# Patient Record
Sex: FEMALE | Race: WHITE | Employment: UNEMPLOYED | URBAN - NONMETROPOLITAN AREA
[De-identification: names, ages, dates, MRNs, and addresses within clinical notes are randomized per-mention and may not be internally consistent; named-entity substitution may affect disease eponyms.]

---

## 2020-12-17 ENCOUNTER — HOSPITAL ENCOUNTER (OUTPATIENT)
Age: 55
Discharge: HOME | End: 2020-12-17
Payer: COMMERCIAL

## 2020-12-17 ENCOUNTER — HOSPITAL ENCOUNTER (OUTPATIENT)
Dept: GENERAL RADIOLOGY | Age: 55
Discharge: HOME | End: 2020-12-17
Payer: COMMERCIAL

## 2020-12-17 PROCEDURE — 73030 X-RAY EXAM OF SHOULDER: CPT

## 2022-02-08 NOTE — PROGRESS NOTES
Chronic Pain/PM&R Clinic Note     Encounter Date: 2/9/22    Subjective:   Chief Complaint:   Chief Complaint   Patient presents with    New Patient     bilateral knee pain, elbow and shoulder       History of Present Illness:   Wei Mack is a 61 y.o. female seen in the clinic initially on 02/09/22 upon request from [address], [address],   for her history of bilateral knee, right shoulder and left elbow pain. Patient states her biggest pain generator at this time is her bilateral hips. Patient states she has had hip pain for over 10 years. She initially started seeing Dr. Monique Magallanes at [facility] in 2012 or 2013 for this issue. Patient states at that time she did have an MRI of her bilateral hips but she does not recall the results. She was never told that she had any significant findings. Patient states she currently uses a back brace to assist with her low back pain. She will only wear this when she is out and about or driving. Patient states last time she did physical therapy for her hips was around 2016. She states this was not beneficial.  Patient denies any recent imaging of her hips. Patient states she does have a history of degenerative disc disease. Patient states pain is aggravated with activity, standing, sitting. She states her right hip is significantly worse than her left. She cannot lay on her right side. Patient states she is not sleeping well due to pain. Patient states she takes Tylenol and Aleve which do help take the edge off. Patient states when she is in significant pain she has to sit down and rest.  He also provides minimal relief, while ice is ineffective. Patient denies use of an assistive device. She denies history of falls. Patient does state that she noticed that she limps when she walks, especially after increased activity. Patient states she is currently living at home with her boyfriend. Patient states she is not currently working as she is trying to get disability. Patient used to work in the [employer] at [employer] where she did a lot of heavy lifting. 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 Patient denies associated leg weakness, saddle anesthesia, leg paresthesias, or bowel or bladder incontinence. Of note, patient has several pain generators such as bilateral knee pain, right shoulder pain, left elbow pain, low back pain. Patient denies history of fibromyalgia, any inflammatory conditions. But she does states she has diffuse joint pain. Patient brought in a list of prior diagnoses from Dr. Ken Thurman. The list includes lumbar DDD, lumbar spondylosis, impingement syndrome of bilateral shoulders, osteoarthritis of left and right shoulder, lumbar spinal stenosis. She is also taking Nam Eastman from a dermatologist for history of psoriasis. Patient states she had surgery on her right shoulder in July 2018 where they placed 6 screws, this is due to her history with osteoarthritis. The surgery was done at [facility]. History of Interventions:   Surgery: No previous lumbar or hip surgeries  Injections: None    Current Treatment Medications:   Alleve - takes the edge off  Tylenol - takes the edge off    Historical Treatment Medications:   Diclofenac - effective at that time  Tramadol - effective at that time    Imaging:  No imaging of bilateral hips    Past Medical History:   Diagnosis Date    Osteoarthritis        Past Surgical History:   Procedure Laterality Date    CARPAL TUNNEL RELEASE Bilateral     CATARACT REMOVAL Left     ROTATOR CUFF REPAIR Right     TUBAL LIGATION         No family history on file.       Medications & Allergies:   Current Outpatient Medications   Medication Instructions    meloxicam (MOBIC) 7.5 mg, Oral, DAILY    PROAIR  (90 Base) MCG/ACT inhaler inhale 2 puffs by mouth every 6 hours if needed    Taltz 80 mg, SubCUTAneous, ONCE       No Known Allergies    Review of Systems:   Constitutional: negative for weight changes or fevers  Genitourinary: negative for bowel/bladder incontinence   Musculoskeletal: positive for bilateral hip pain  Neurological: negative for any leg weakness or numbness/tingling  Behavioral/Psych: negative for anxiety/depression   All other systems reviewed and are negative    Objective:     Vitals:    02/09/22 0948   BP: 110/82   Pulse: 63   SpO2: 96%       Constitutional: Pleasant, no acute distress   Head: Normocephalic, atraumatic   Eyes: Conjunctivae normal   Neck: Supple, symmetrical   Lungs: Normal respiratory effort, non-labored breathing   Cardiovascular: Limbs warm and well perfused   Abdomen: Non-protruded   Musculoskeletal: Muscle bulk symmetric, no atrophy, no gross deformities   · Lower Extremities: ROM WNL. · Thorax: No paraspinal tenderness bilaterally. No scoliosis or kyphosis. · Lumbar Spine: ROM WNL. Lumbar paraspinals non-tender to palpation bilaterally. SLR neg bilaterally. STEVIE positive bilaterally. GAENSLEN positive bilaterally. Positive facet loading bilaterally. Bilateral SI joints tender to palpation. SI joint distraction positive bilaterally. Bilateral greater trochanters non-tender to palpation. Neurological: Cranial nerves II-XII grossly intact. · Gait - Antalgic gait. Ambulates without assistive device. · Motor: 4/5 muscle strength in bilateral hip flexion, knee flexion, knee extension, ankle dorsiflexion, and ankle plantar flexion. Very likely pain limited weakness. · Sensory: LT sensation intact in lower limbs   · Reflexes: 2+ symmetrical in bilateral achilles, 2+ bilateral patellar, negative ankle clonus, downgoing babinski   Skin: No rashes or lesions present   Psychological: Cooperative, no exaggerated pain behaviors     Assessment:    Diagnosis Orders   1.  Sacroiliac joint dysfunction of both sides  CHG FLUOR NEEDLE/CATH SPINE/PARASPINAL DX/THER ADDON    UT INJECT SI JOINT ARTHRGRPHY&/ANES/STEROID W/IMAGE   2. Chronic pain syndrome     3. Lumbar spondylosis     4. Chronic pain of both knees     5. Chronic right shoulder pain     6. Left elbow pain           Luz Castro is a 61 y. o.female presenting to the pain clinic for evaluation of chronic pain in several joints. Patient states her biggest pain generator is her bilateral hips. Patient's history and physical consistent with bilateral SI joint dysfunction and pain. I have set her up for a bilateral SI joint injection with Dr. Елена Andre. I have started her on Mobic 7.5 mg daily, she is to take this with food. Patient advised to stop taking Aleve. We discussed that she has several pain generators and will have to address one at a time. Plan: The following treatment recommendations and plan were discussed in detail with Luz Castro. Imaging:   None    Analgesics:   Patient is taking Acetaminophen. Patient informed that the maximum amount of acetaminophen taken on a regular basis should only be 4000 mg per day. I have started the patient on Mobic 7.5 mg daily. Patient is advised to take as prescribed and not take on an empty stomach. Adjuvants:   None    Interventions: With examination consistent with bilateral sacroiliac dysfunction/pain, we will proceed with a bilateral sacroiliac joint injection. The risks and benefits were discussed in detail with the patient. Patient wants to proceed with the injection. Anticoagulation/NPO Recommendations:   Patient does not need to hold any medications prior to the procedure. Patient does not need to be NPO prior to the procedure. We will do a LOCAL injection  Patient will need a  if possible    Multidisciplinary Pain Management:   In the presence of complex, chronic, and multi-factorial pain, the importance of a multidisciplinary approach to pain management in the patients management regimen was emphasized and discussed in great detail.    PHYSICAL THERAPY: Patient is advised to see a physical therapist for gentle stretching exercises and conditioning exercises for management of pain.      Referrals:  None    Prescriptions Written This Visit:   Mobic 7.5mg    Follow-up: Bilateral SI joint injection with Dr. Albina Salinas, APRN - CNP

## 2022-02-09 ENCOUNTER — OFFICE VISIT (OUTPATIENT)
Dept: PHYSICAL MEDICINE AND REHAB | Age: 57
End: 2022-02-09
Payer: MEDICARE

## 2022-02-09 VITALS
BODY MASS INDEX: 38.57 KG/M2 | WEIGHT: 240 LBS | HEIGHT: 66 IN | DIASTOLIC BLOOD PRESSURE: 82 MMHG | SYSTOLIC BLOOD PRESSURE: 110 MMHG | OXYGEN SATURATION: 96 % | HEART RATE: 63 BPM

## 2022-02-09 DIAGNOSIS — G89.4 CHRONIC PAIN SYNDROME: ICD-10-CM

## 2022-02-09 DIAGNOSIS — M47.816 LUMBAR SPONDYLOSIS: ICD-10-CM

## 2022-02-09 DIAGNOSIS — G89.29 CHRONIC PAIN OF BOTH KNEES: ICD-10-CM

## 2022-02-09 DIAGNOSIS — M25.562 CHRONIC PAIN OF BOTH KNEES: ICD-10-CM

## 2022-02-09 DIAGNOSIS — M25.511 CHRONIC RIGHT SHOULDER PAIN: ICD-10-CM

## 2022-02-09 DIAGNOSIS — M25.522 LEFT ELBOW PAIN: ICD-10-CM

## 2022-02-09 DIAGNOSIS — M25.561 CHRONIC PAIN OF BOTH KNEES: ICD-10-CM

## 2022-02-09 DIAGNOSIS — G89.29 CHRONIC RIGHT SHOULDER PAIN: ICD-10-CM

## 2022-02-09 DIAGNOSIS — M53.3 SACROILIAC JOINT DYSFUNCTION OF BOTH SIDES: Primary | ICD-10-CM

## 2022-02-09 PROCEDURE — G8417 CALC BMI ABV UP PARAM F/U: HCPCS | Performed by: NURSE PRACTITIONER

## 2022-02-09 PROCEDURE — 99204 OFFICE O/P NEW MOD 45 MIN: CPT | Performed by: NURSE PRACTITIONER

## 2022-02-09 PROCEDURE — G8427 DOCREV CUR MEDS BY ELIG CLIN: HCPCS | Performed by: NURSE PRACTITIONER

## 2022-02-09 PROCEDURE — G8484 FLU IMMUNIZE NO ADMIN: HCPCS | Performed by: NURSE PRACTITIONER

## 2022-02-09 PROCEDURE — 4004F PT TOBACCO SCREEN RCVD TLK: CPT | Performed by: NURSE PRACTITIONER

## 2022-02-09 PROCEDURE — 3017F COLORECTAL CA SCREEN DOC REV: CPT | Performed by: NURSE PRACTITIONER

## 2022-02-09 RX ORDER — IXEKIZUMAB 80 MG/ML
80 INJECTION, SOLUTION SUBCUTANEOUS ONCE
COMMUNITY
End: 2022-10-27 | Stop reason: ALTCHOICE

## 2022-02-09 RX ORDER — MELOXICAM 7.5 MG/1
7.5 TABLET ORAL DAILY
Qty: 30 TABLET | Refills: 0 | Status: SHIPPED | OUTPATIENT
Start: 2022-02-09 | End: 2022-03-14 | Stop reason: SDUPTHER

## 2022-02-11 ENCOUNTER — HOSPITAL ENCOUNTER (OUTPATIENT)
Dept: GENERAL RADIOLOGY | Age: 57
Discharge: HOME | End: 2022-02-11

## 2022-02-11 DIAGNOSIS — Z00.6 ENCOUNTER FOR EXAMINATION FOR NORMAL COMPARISON AND CONTROL IN CLINICAL RESEARCH PROGRAM: ICD-10-CM

## 2022-02-24 ENCOUNTER — PREP FOR PROCEDURE (OUTPATIENT)
Dept: PHYSICAL MEDICINE AND REHAB | Age: 57
End: 2022-02-24

## 2022-02-27 NOTE — H&P
Today, patient presents for planned bilateral sacroiliac joint injection. This note is reflective of the patient's previous visit for evaluation. We will proceed with today's planned procedure. Since patient's last visit for evaluation, there have been no interval changes in medical history. Patient has no new numbness, weakness, or focal neurological deficit since evaluation. Patient has no contraindications to injection (no anticoagulation or recent antibiotic intake for active infections), and has a  present or is able to drive themselves (as discussed and cleared by physician). Allergies to latex, contrast dye, and steroid medications have been confirmed with the patient prior to the procedure. NPO necessity has been assessed and accepted based on procedure complexity. The risks and benefits of the procedure have been explained including but are not limited to infection, bleeding, paralysis, immediate post procedure weakness, and dizziness; the patient acknowledges understanding and desires to proceed with the procedure. Patient has signed consent for same procedure as discussed in previous clinic encounter. All other questions and concerns were addressed at bedside. See procedure note for full details. Post procedure Instructions: The patient was advised not to drive during the day of the procedure and not to engage in any significant decision making (unless otherwise states by physician). The patient was also advised to be cautious with walking/activity for 24 hours following today's visit and asked not to engage in over-exertion (unless otherwise states by physician). After this time, it is ok to resume pre-procedure level of activity. Patient advised to apply ice to site of injection in situations of pain and discomfort. Patient advised to not submerge site of injection during bath or pool activities for approximately 24 hours post-procedure.  Patient attested to understanding post procedure directions / restrictions. All other questions and concerns addressed before patient discharge in ambulatory fashion. Chronic Pain/PM&R Clinic Note     Encounter Date: 2/9/22    Subjective:   Chief Complaint:   No chief complaint on file. History of Present Illness:   Renata Velásquez is a 61 y.o. female seen in the clinic initially on 02/27/22 upon request from No ref. provider found  for her history of bilateral knee, right shoulder and left elbow pain. Patient states her biggest pain generator at this time is her bilateral hips. Patient states she has had hip pain for over 10 years. She initially started seeing Dr. Leonardo Guy at [facility] in 2012 or 2013 for this issue. Patient states at that time she did have an MRI of her bilateral hips but she does not recall the results. She was never told that she had any significant findings. Patient states she currently uses a back brace to assist with her low back pain. She will only wear this when she is out and about or driving. Patient states last time she did physical therapy for her hips was around 2016. She states this was not beneficial.  Patient denies any recent imaging of her hips. Patient states she does have a history of degenerative disc disease. Patient states pain is aggravated with activity, standing, sitting. She states her right hip is significantly worse than her left. She cannot lay on her right side. Patient states she is not sleeping well due to pain. Patient states she takes Tylenol and Aleve which do help take the edge off. Patient states when she is in significant pain she has to sit down and rest.  He also provides minimal relief, while ice is ineffective. Patient denies use of an assistive device. She denies history of falls. Patient does state that she noticed that she limps when she walks, especially after increased activity. Patient states she is currently living at home with her boyfriend.   Patient states she is not currently working as she is trying to get disability. Patient used to work in the [employer] at [employer] where she did a lot of heavy lifting. 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 Patient denies associated leg weakness, saddle anesthesia, leg paresthesias, or bowel or bladder incontinence. Of note, patient has several pain generators such as bilateral knee pain, right shoulder pain, left elbow pain, low back pain. Patient denies history of fibromyalgia, any inflammatory conditions. But she does states she has diffuse joint pain. Patient brought in a list of prior diagnoses from Dr. Jessica Gallardo. The list includes lumbar DDD, lumbar spondylosis, impingement syndrome of bilateral shoulders, osteoarthritis of left and right shoulder, lumbar spinal stenosis. She is also taking Clarnce Klinefelter from a dermatologist for history of psoriasis. Patient states she had surgery on her right shoulder in July 2018 where they placed 6 screws, this is due to her history with osteoarthritis. The surgery was done at [facility]. History of Interventions:   Surgery: No previous lumbar or hip surgeries  Injections: None    Current Treatment Medications:   Alleve - takes the edge off  Tylenol - takes the edge off    Historical Treatment Medications:   Diclofenac - effective at that time  Tramadol - effective at that time    Imaging:  No imaging of bilateral hips    Past Medical History:   Diagnosis Date    Osteoarthritis        Past Surgical History:   Procedure Laterality Date    CARPAL TUNNEL RELEASE Bilateral     CATARACT REMOVAL Left     ROTATOR CUFF REPAIR Right     TUBAL LIGATION         No family history on file.       Medications & Allergies:   Current Outpatient Medications   Medication Instructions    meloxicam (MOBIC) 7.5 mg, Oral, DAILY    PROAIR  (90 Base) MCG/ACT inhaler inhale 2 puffs by mouth every 6 hours if needed    Taltz 80 mg, SubCUTAneous, ONCE       No Known Allergies    Review of Systems:   Constitutional: negative for weight changes or fevers  Genitourinary: negative for bowel/bladder incontinence   Musculoskeletal: positive for bilateral hip pain  Neurological: negative for any leg weakness or numbness/tingling  Behavioral/Psych: negative for anxiety/depression   All other systems reviewed and are negative    Objective: There were no vitals filed for this visit. Constitutional: Pleasant, no acute distress   Head: Normocephalic, atraumatic   Eyes: Conjunctivae normal   Neck: Supple, symmetrical   Lungs: Normal respiratory effort, non-labored breathing   Cardiovascular: Limbs warm and well perfused   Abdomen: Non-protruded   Musculoskeletal: Muscle bulk symmetric, no atrophy, no gross deformities   · Lower Extremities: ROM WNL. · Thorax: No paraspinal tenderness bilaterally. No scoliosis or kyphosis. · Lumbar Spine: ROM WNL. Lumbar paraspinals non-tender to palpation bilaterally. SLR neg bilaterally. STEVIE positive bilaterally. GAENSLEN positive bilaterally. Positive facet loading bilaterally. Bilateral SI joints tender to palpation. SI joint distraction positive bilaterally. Bilateral greater trochanters non-tender to palpation. Neurological: Cranial nerves II-XII grossly intact. · Gait - Antalgic gait. Ambulates without assistive device. · Motor: 4/5 muscle strength in bilateral hip flexion, knee flexion, knee extension, ankle dorsiflexion, and ankle plantar flexion. Very likely pain limited weakness. · Sensory: LT sensation intact in lower limbs   · Reflexes: 2+ symmetrical in bilateral achilles, 2+ bilateral patellar, negative ankle clonus, downgoing babinski   Skin: No rashes or lesions present   Psychological: Cooperative, no exaggerated pain behaviors     Assessment:    Diagnosis Orders   1.  Sacroiliac joint dysfunction of both sides  CHG FLUOR NEEDLE/CATH SPINE/PARASPINAL DX/THER LIIANA    FL INJECT SI JOINT ARTHRGRPHY&/ANES/STEROID W/IMAGE   2. Chronic pain syndrome     3. Lumbar spondylosis     4. Chronic pain of both knees     5. Chronic right shoulder pain     6. Left elbow pain           Susan Case is a 61 y. o.female presenting to the pain clinic for evaluation of chronic pain in several joints. Patient states her biggest pain generator is her bilateral hips. Patient's history and physical consistent with bilateral SI joint dysfunction and pain. I have set her up for a bilateral SI joint injection with Dr. Elba Palomares. I have started her on Mobic 7.5 mg daily, she is to take this with food. Patient advised to stop taking Aleve. We discussed that she has several pain generators and will have to address one at a time. Plan: The following treatment recommendations and plan were discussed in detail with uSsan Case. Imaging:   None    Analgesics:   Patient is taking Acetaminophen. Patient informed that the maximum amount of acetaminophen taken on a regular basis should only be 4000 mg per day. I have started the patient on Mobic 7.5 mg daily. Patient is advised to take as prescribed and not take on an empty stomach. Adjuvants:   None    Interventions: With examination consistent with bilateral sacroiliac dysfunction/pain, we will proceed with a bilateral sacroiliac joint injection. The risks and benefits were discussed in detail with the patient. Patient wants to proceed with the injection. Anticoagulation/NPO Recommendations:   Patient does not need to hold any medications prior to the procedure. Patient does not need to be NPO prior to the procedure.   We will do a LOCAL injection  Patient will need a  if possible    Multidisciplinary Pain Management:   In the presence of complex, chronic, and multi-factorial pain, the importance of a multidisciplinary approach to pain management in the patients management regimen was emphasized and discussed in great detail. PHYSICAL THERAPY: Patient is advised to see a physical therapist for gentle stretching exercises and conditioning exercises for management of pain.      Referrals:  None    Prescriptions Written This Visit:   Mobic 7.5mg    Follow-up: Bilateral SI joint injection with Dr. Richard Calix DO

## 2022-03-01 ENCOUNTER — APPOINTMENT (OUTPATIENT)
Dept: GENERAL RADIOLOGY | Age: 57
End: 2022-03-01
Attending: ANESTHESIOLOGY
Payer: MEDICARE

## 2022-03-01 ENCOUNTER — HOSPITAL ENCOUNTER (OUTPATIENT)
Age: 57
Setting detail: OUTPATIENT SURGERY
Discharge: HOME | End: 2022-03-01
Attending: ANESTHESIOLOGY | Admitting: ANESTHESIOLOGY
Payer: MEDICARE

## 2022-03-01 VITALS
DIASTOLIC BLOOD PRESSURE: 89 MMHG | OXYGEN SATURATION: 97 % | BODY MASS INDEX: 36.49 KG/M2 | TEMPERATURE: 97.3 F | SYSTOLIC BLOOD PRESSURE: 155 MMHG | WEIGHT: 219 LBS | HEIGHT: 65 IN | HEART RATE: 59 BPM | RESPIRATION RATE: 15 BRPM

## 2022-03-01 PROCEDURE — 27096 INJECT SACROILIAC JOINT: CPT | Performed by: ANESTHESIOLOGY

## 2022-03-01 PROCEDURE — 3209999900 FLUORO FOR SURGICAL PROCEDURES

## 2022-03-01 PROCEDURE — 6360000004 HC RX CONTRAST MEDICATION: Performed by: ANESTHESIOLOGY

## 2022-03-01 PROCEDURE — 2500000003 HC RX 250 WO HCPCS: Performed by: ANESTHESIOLOGY

## 2022-03-01 PROCEDURE — 3600000054 HC PAIN LEVEL 3 BASE: Performed by: ANESTHESIOLOGY

## 2022-03-01 PROCEDURE — 2709999900 HC NON-CHARGEABLE SUPPLY: Performed by: ANESTHESIOLOGY

## 2022-03-01 PROCEDURE — 7100000010 HC PHASE II RECOVERY - FIRST 15 MIN: Performed by: ANESTHESIOLOGY

## 2022-03-01 PROCEDURE — 6360000002 HC RX W HCPCS: Performed by: ANESTHESIOLOGY

## 2022-03-01 RX ORDER — BUPIVACAINE HYDROCHLORIDE 5 MG/ML
INJECTION, SOLUTION PERINEURAL PRN
Status: DISCONTINUED | OUTPATIENT
Start: 2022-03-01 | End: 2022-03-01 | Stop reason: ALTCHOICE

## 2022-03-01 RX ORDER — METHYLPREDNISOLONE ACETATE 80 MG/ML
INJECTION, SUSPENSION INTRA-ARTICULAR; INTRALESIONAL; INTRAMUSCULAR; SOFT TISSUE PRN
Status: DISCONTINUED | OUTPATIENT
Start: 2022-03-01 | End: 2022-03-01 | Stop reason: ALTCHOICE

## 2022-03-01 RX ORDER — LIDOCAINE HYDROCHLORIDE 10 MG/ML
INJECTION, SOLUTION EPIDURAL; INFILTRATION; INTRACAUDAL; PERINEURAL PRN
Status: DISCONTINUED | OUTPATIENT
Start: 2022-03-01 | End: 2022-03-01 | Stop reason: ALTCHOICE

## 2022-03-01 ASSESSMENT — PAIN SCALES - GENERAL: PAINLEVEL_OUTOF10: 0

## 2022-03-01 ASSESSMENT — PAIN - FUNCTIONAL ASSESSMENT: PAIN_FUNCTIONAL_ASSESSMENT: 0-10

## 2022-03-01 ASSESSMENT — PAIN DESCRIPTION - DESCRIPTORS: DESCRIPTORS: ACHING

## 2022-03-01 NOTE — PROCEDURES
Pre-operative Diagnosis:  SI joint pain     Post-operative Diagnosis:  SI joint pain     Procedure: Bilateral SI joint injection     Procedure Description:  After having signed the informed consent, the patient was placed in the prone position. The patient's back was prepped with chloraprep solution, and draped in a sterile fashion. A total of 2 ml of 1% lidocaine were used to anesthetize the skin and underlying tissues. Under fluoroscopic guidance a single 22-gauge, 3.5 inch spinal needle was advanced to lie within the inferior pole of the RIGHT sacroiliac joint. There were no paresthesias or heme aspiration. Needle placement was confirmed in the AP view. After negative aspiration, 0.5 ml of Omnipaque 300 contrast was injected with appropriate spread observed. A total of 2 ml of 0.5% bupivicaine mixed with 40 mg depo-medrol were injected into the sacroiliac joint. The needle was withdrawn without any complications. This exact procedure was repeated on the contralateral side. The patient tolerated the procedure well, was transported to the recovery room and observed for 15 minutes and discharged in an ambulatory fashion. No immediate reported complications.     Procedural Complications: None  Estimated Blood Loss: 0 mL    Sergey Barcenas DO  Interventional Pain Management/PM&R   . [facility] and [facility]

## 2022-03-01 NOTE — PROGRESS NOTES
4631: patient to phase II. Report received from circulating nurse. Cart in lowest position and locked. Vital signs obtained. Pt denies pain and nausea. Given ice pack for lower back. 0059: Pt getting dressed. Ride called. 0830: patient meets discharge criteria. Patient given snack and drink for ride home per request.  Patient ambulated to car passenger seat in stable condition with this RN by side.

## 2022-03-11 NOTE — TELEPHONE ENCOUNTER
[facility] [employer] [facility] [employer] called requesting a refill on the following medications:  Requested Prescriptions     Pending Prescriptions Disp Refills    meloxicam (MOBIC) 7.5 MG tablet 30 tablet 0     Sig: Take 1 tablet by mouth daily     Pharmacy verified: [facility]      Date of last visit: 2/9/2022  Date of next visit (if applicable): 6/49/0918

## 2022-03-14 NOTE — TELEPHONE ENCOUNTER
OARRS reviewed. UDS: Negative   Last seen: 2/9/2022.  Follow-up:   Future Appointments   Date Time Provider Lorenzo Holloway   3/29/2022  9:00 AM MARBIN Das - CNP [facility] [address]

## 2022-03-15 RX ORDER — MELOXICAM 7.5 MG/1
7.5 TABLET ORAL DAILY
Qty: 30 TABLET | Refills: 1 | Status: SHIPPED | OUTPATIENT
Start: 2022-03-15 | End: 2022-03-29 | Stop reason: SDUPTHER

## 2022-03-29 ENCOUNTER — OFFICE VISIT (OUTPATIENT)
Dept: PHYSICAL MEDICINE AND REHAB | Age: 57
End: 2022-03-29
Payer: MEDICARE

## 2022-03-29 VITALS
HEART RATE: 66 BPM | HEIGHT: 65 IN | WEIGHT: 219 LBS | SYSTOLIC BLOOD PRESSURE: 138 MMHG | DIASTOLIC BLOOD PRESSURE: 84 MMHG | BODY MASS INDEX: 36.49 KG/M2

## 2022-03-29 DIAGNOSIS — M25.511 CHRONIC RIGHT SHOULDER PAIN: Primary | ICD-10-CM

## 2022-03-29 DIAGNOSIS — M47.816 LUMBAR SPONDYLOSIS: ICD-10-CM

## 2022-03-29 DIAGNOSIS — M53.3 SACROILIAC JOINT DYSFUNCTION OF BOTH SIDES: ICD-10-CM

## 2022-03-29 DIAGNOSIS — G89.29 CHRONIC RIGHT SHOULDER PAIN: Primary | ICD-10-CM

## 2022-03-29 DIAGNOSIS — G89.4 CHRONIC PAIN SYNDROME: ICD-10-CM

## 2022-03-29 PROCEDURE — 4004F PT TOBACCO SCREEN RCVD TLK: CPT | Performed by: NURSE PRACTITIONER

## 2022-03-29 PROCEDURE — G8484 FLU IMMUNIZE NO ADMIN: HCPCS | Performed by: NURSE PRACTITIONER

## 2022-03-29 PROCEDURE — 99214 OFFICE O/P EST MOD 30 MIN: CPT | Performed by: NURSE PRACTITIONER

## 2022-03-29 PROCEDURE — G8427 DOCREV CUR MEDS BY ELIG CLIN: HCPCS | Performed by: NURSE PRACTITIONER

## 2022-03-29 PROCEDURE — G8417 CALC BMI ABV UP PARAM F/U: HCPCS | Performed by: NURSE PRACTITIONER

## 2022-03-29 PROCEDURE — 3017F COLORECTAL CA SCREEN DOC REV: CPT | Performed by: NURSE PRACTITIONER

## 2022-03-29 RX ORDER — MELOXICAM 15 MG/1
15 TABLET ORAL DAILY
Qty: 30 TABLET | Refills: 0 | Status: SHIPPED | OUTPATIENT
Start: 2022-03-29 | End: 2022-05-02 | Stop reason: SDUPTHER
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
## 2022-04-07 DIAGNOSIS — M25.511 CHRONIC RIGHT SHOULDER PAIN: ICD-10-CM

## 2022-04-07 DIAGNOSIS — G89.29 CHRONIC RIGHT SHOULDER PAIN: ICD-10-CM

## 2022-04-07 DIAGNOSIS — M53.3 SACROILIAC JOINT DYSFUNCTION OF BOTH SIDES: ICD-10-CM

## 2022-04-08 ENCOUNTER — TELEPHONE (OUTPATIENT)
Dept: PHYSICAL MEDICINE AND REHAB | Age: 57
End: 2022-04-08

## 2022-04-08 NOTE — TELEPHONE ENCOUNTER
Notif. Pt. Of results of xrays as per Rusty Mckeon NP. To discuss further tx options ant net f/u. Pt. Verbalized understanding.

## 2022-05-02 ENCOUNTER — OFFICE VISIT (OUTPATIENT)
Dept: PHYSICAL MEDICINE AND REHAB | Age: 57
End: 2022-05-02
Payer: MEDICARE

## 2022-05-02 VITALS
DIASTOLIC BLOOD PRESSURE: 78 MMHG | BODY MASS INDEX: 36.49 KG/M2 | WEIGHT: 219 LBS | SYSTOLIC BLOOD PRESSURE: 128 MMHG | HEIGHT: 65 IN

## 2022-05-02 DIAGNOSIS — M79.18 MYOFASCIAL PAIN: ICD-10-CM

## 2022-05-02 DIAGNOSIS — M53.3 SACROILIAC JOINT DYSFUNCTION OF BOTH SIDES: ICD-10-CM

## 2022-05-02 DIAGNOSIS — M25.50 ARTHRALGIA, UNSPECIFIED JOINT: Primary | ICD-10-CM

## 2022-05-02 DIAGNOSIS — G89.4 CHRONIC PAIN SYNDROME: ICD-10-CM

## 2022-05-02 DIAGNOSIS — G89.29 CHRONIC RIGHT SHOULDER PAIN: ICD-10-CM

## 2022-05-02 DIAGNOSIS — M25.511 CHRONIC RIGHT SHOULDER PAIN: ICD-10-CM

## 2022-05-02 DIAGNOSIS — M47.816 LUMBAR SPONDYLOSIS: ICD-10-CM

## 2022-05-02 PROCEDURE — G8417 CALC BMI ABV UP PARAM F/U: HCPCS | Performed by: NURSE PRACTITIONER

## 2022-05-02 PROCEDURE — 99204 OFFICE O/P NEW MOD 45 MIN: CPT | Performed by: NURSE PRACTITIONER

## 2022-05-02 PROCEDURE — 20552 NJX 1/MLT TRIGGER POINT 1/2: CPT | Performed by: NURSE PRACTITIONER

## 2022-05-02 PROCEDURE — 3017F COLORECTAL CA SCREEN DOC REV: CPT | Performed by: NURSE PRACTITIONER

## 2022-05-02 PROCEDURE — 4004F PT TOBACCO SCREEN RCVD TLK: CPT | Performed by: NURSE PRACTITIONER

## 2022-05-02 PROCEDURE — G8427 DOCREV CUR MEDS BY ELIG CLIN: HCPCS | Performed by: NURSE PRACTITIONER

## 2022-05-02 RX ORDER — MELOXICAM 15 MG/1
15 TABLET ORAL DAILY
Qty: 30 TABLET | Refills: 2 | Status: SHIPPED | OUTPATIENT
Start: 2022-05-02 | End: 2022-08-10 | Stop reason: SDUPTHER

## 2022-05-02 RX ORDER — METHOCARBAMOL 100 MG/ML
100 INJECTION, SOLUTION INTRAMUSCULAR; INTRAVENOUS ONCE
Status: COMPLETED | OUTPATIENT
Start: 2022-05-02 | End: 2022-05-02

## 2022-05-02 RX ADMIN — METHOCARBAMOL 100 MG: 100 INJECTION, SOLUTION INTRAMUSCULAR; INTRAVENOUS at 09:47
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
## 2022-05-02 NOTE — PROGRESS NOTES
Pre-operative Diagnosis:  right cervical pain     Post-operative Diagnosis: right cervical pain     Procedure: Trigger point injection(s)     Procedure Description:  After having signed the informed consent, the patient was seated in a chair. The patient's right cervical region was prepped with alcohol wipes. Trigger points were identified in the right cervical paraspinals and right trapezius for a total of 10 trigger point injections. After negative aspiration, 1 cc of a mixture containing 1:10 100 mg Methocarbamol: 0.5% bupivacaine was injected at each trigger point for a total of 10 trigger points. The patient tolerated the procedure well.      Procedural Complications: None        MARBIN Lua - CNP   Interventional Pain Management/PM&R   [place]

## 2022-05-09 RX ORDER — MELOXICAM 7.5 MG/1
TABLET ORAL
Qty: 30 TABLET | Refills: 1 | OUTPATIENT
Start: 2022-05-09

## 2022-06-06 ENCOUNTER — OFFICE VISIT (OUTPATIENT)
Dept: PHYSICAL MEDICINE AND REHAB | Age: 57
End: 2022-06-06
Payer: MEDICARE

## 2022-06-06 VITALS
DIASTOLIC BLOOD PRESSURE: 80 MMHG | SYSTOLIC BLOOD PRESSURE: 124 MMHG | WEIGHT: 219 LBS | HEIGHT: 65 IN | BODY MASS INDEX: 36.49 KG/M2

## 2022-06-06 DIAGNOSIS — M47.819 FACET ARTHROPATHY: ICD-10-CM

## 2022-06-06 DIAGNOSIS — G89.4 CHRONIC PAIN SYNDROME: ICD-10-CM

## 2022-06-06 DIAGNOSIS — M53.3 SACROILIAC JOINT DYSFUNCTION OF BOTH SIDES: Primary | ICD-10-CM

## 2022-06-06 DIAGNOSIS — M47.816 LUMBAR SPONDYLOSIS: ICD-10-CM

## 2022-06-06 DIAGNOSIS — M79.18 MYOFASCIAL PAIN: ICD-10-CM

## 2022-06-06 PROCEDURE — 3017F COLORECTAL CA SCREEN DOC REV: CPT | Performed by: NURSE PRACTITIONER

## 2022-06-06 PROCEDURE — 4004F PT TOBACCO SCREEN RCVD TLK: CPT | Performed by: NURSE PRACTITIONER

## 2022-06-06 PROCEDURE — 99214 OFFICE O/P EST MOD 30 MIN: CPT | Performed by: NURSE PRACTITIONER

## 2022-06-06 PROCEDURE — G8417 CALC BMI ABV UP PARAM F/U: HCPCS | Performed by: NURSE PRACTITIONER

## 2022-06-06 PROCEDURE — G8427 DOCREV CUR MEDS BY ELIG CLIN: HCPCS | Performed by: NURSE PRACTITIONER

## 2022-06-06 RX ORDER — TIZANIDINE 4 MG/1
TABLET ORAL
COMMUNITY
Start: 2022-06-05 | End: 2022-08-04

## 2022-06-06 RX ORDER — LOSARTAN POTASSIUM 50 MG/1
TABLET ORAL
COMMUNITY
Start: 2022-05-23

## 2022-06-06 RX ORDER — NYSTATIN 100000 [USP'U]/G
POWDER TOPICAL
Qty: 1 EACH | Refills: 0 | Status: SHIPPED | OUTPATIENT
Start: 2022-06-06 | End: 2022-08-04

## 2022-06-06 NOTE — PROGRESS NOTES
Chronic Pain/PM&R Clinic Note     Encounter Date: 6/6/22    Subjective:   Chief Complaint:   Chief Complaint   Patient presents with    1 Month Follow-Up       History of Present Illness:   Pérez Medina is a 61 y.o. female seen in the clinic initially on 02/09/2022 upon request from [address], [place], DO for her history of bilateral knee, right shoulder and left elbow pain. Patient states her biggest pain generator at this time is her bilateral hips. Patient states she has had hip pain for over 10 years. She initially started seeing Dr. Nathaniel Ortega at [facility] in 2012 or 2013 for this issue. Patient states at that time she did have an MRI of her bilateral hips but she does not recall the results. She was never told that she had any significant findings. Patient states she currently uses a back brace to assist with her low back pain. She will only wear this when she is out and about or driving. Patient states last time she did physical therapy for her hips was around 2016. She states this was not beneficial.  Patient denies any recent imaging of her hips. Patient states she does have a history of degenerative disc disease. Patient states pain is aggravated with activity, standing, sitting. She states her right hip is significantly worse than her left. She cannot lay on her right side. Patient states she is not sleeping well due to pain. Patient states she takes Tylenol and Aleve which do help take the edge off. Patient states when she is in significant pain she has to sit down and rest.  He also provides minimal relief, while ice is ineffective. Patient denies use of an assistive device. She denies history of falls. Patient does state that she noticed that she limps when she walks, especially after increased activity. Patient states she is currently living at home with her boyfriend. Patient states she is not currently working as she is trying to get disability.   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 Patient denies associated leg weakness, saddle anesthesia, leg paresthesias, or bowel or bladder incontinence. Of note, patient has several pain generators such as bilateral knee pain, right shoulder pain, left elbow pain, low back pain. Patient denies history of fibromyalgia, any inflammatory conditions. But she does states she has diffuse joint pain. Patient brought in a list of prior diagnoses from Dr. Edwardo Casey. The list includes lumbar DDD, lumbar spondylosis, impingement syndrome of bilateral shoulders, osteoarthritis of left and right shoulder, lumbar spinal stenosis. She is also taking Cheryl Bravo from a dermatologist for history of psoriasis. Patient states she had surgery on her right shoulder in July 2018 where they placed 6 screws, this is due to her history with osteoarthritis. The surgery was done at [facility]. Today, 6/6/2022, patient presents for planned follow-up on chronic low back and right shoulder pain. Patient states that her right shoulder pain has been tolerable. Her biggest pain generator at this time is her low back pain. She was in the ER on 5/20/2022 with an acute flareup of her chronic low back pain. She states she received an injection in both hips (Toradol and Norflex IM) which may have helped for a day or two. She states she was also told her blood pressure was elevated due to the severity of her pain. She has followed up with her PCP and was started on blood pressure medication as well as tizanidine. She states the tizanidine has been effective, she does have a little fatigue but it is tolerable. She states she has been getting some burning pain in her bilateral hips as well as some increased weakness in her legs.   Patient denies saddle anesthesia or bowel/bladder incontinence. History of Interventions:   Surgery: No previous lumbar or hip surgeries  Right shoulder tendon and rotator cuff repair - hardware in place. Injections: B/L SI joint injection (03/01/2022) - >85% relief  Cervical TPI (05/02/2022) - significant relief    Current Treatment Medications:   Alleve - takes the edge off  Tylenol - takes the edge off  Mobic 15 mg  Tizanidine 4 mg - effective    Historical Treatment Medications:   Diclofenac - effective at that time  Tramadol - effective at that time    Imaging:  Xray right shoulder (04/0/2022)        Past Medical History:   Diagnosis Date    Osteoarthritis        Past Surgical History:   Procedure Laterality Date    BACK INJECTION Bilateral 3/1/2022    Bilateral SI joint injection performed by Dee Dee Coats DO at [place] [id number] Bilateral     CATARACT REMOVAL Left     ROTATOR CUFF REPAIR Right     TUBAL LIGATION         No family history on file. Medications & Allergies:   Current Outpatient Medications   Medication Instructions    losartan (COZAAR) 50 mg tablet take 1 tablet by mouth once daily    meloxicam (MOBIC) 15 mg, Oral, DAILY    PROAIR  (90 Base) MCG/ACT inhaler inhale 2 puffs by mouth every 6 hours if needed    Taltz 80 mg, SubCUTAneous, ONCE    tiZANidine (ZANAFLEX) 4 MG tablet No dose, route, or frequency recorded.        No Known Allergies    Review of Systems:   Constitutional: negative for weight changes or fevers  Genitourinary: negative for bowel/bladder incontinence   Musculoskeletal: positive for bilateral hip pain, right shoulder pain  Neurological: negative for any leg weakness or numbness/tingling  Behavioral/Psych: negative for anxiety/depression   All other systems reviewed and are negative    Objective:     Vitals:    06/06/22 1333   BP: 124/80       Constitutional: Pleasant, no acute distress   Head: Normocephalic, atraumatic   Eyes: Conjunctivae normal   Neck: Supple, symmetrical   Lungs: Normal respiratory effort, non-labored breathing   Cardiovascular: Limbs warm and well perfused   Abdomen: Non-protruded   Musculoskeletal: Muscle bulk symmetric, no atrophy, no gross deformities   Upper extremities: Right arm: Forward flexion 90%, Abduction 90%. Positive lift-off test. Extreme tenderness with palpation of right shoulder, right trapezius, and right cervical paraspinals. · Lower Extremities: ROM WNL. · Thorax: No paraspinal tenderness bilaterally. No scoliosis or kyphosis. · Lumbar Spine: ROM WNL. Lumbar paraspinals non-tender to palpation bilaterally. SLR neg bilaterally. STEVIE positive bilaterally. GAENSLEN positive bilaterally. Positive facet loading bilaterally. Bilateral SI joints tender to palpation. SI joint distraction positive bilaterally. Bilateral greater trochanters non-tender to palpation. Neurological: Cranial nerves II-XII grossly intact. · Gait - Antalgic gait. Ambulates without assistive device. · Motor: 4/5 muscle strength in bilateral hip flexion, knee flexion, knee extension, ankle dorsiflexion, and ankle plantar flexion. Very likely pain limited weakness. · Sensory: LT sensation intact in lower limbs   · Reflexes: 2+ symmetrical in bilateral achilles, 2+ bilateral patellar, negative ankle clonus, downgoing babinski   Skin: No rashes or lesions present   Psychological: Cooperative, no exaggerated pain behaviors     Assessment:    Diagnosis Orders   1. Sacroiliac joint dysfunction of both sides     2. Lumbar spondylosis     3. Chronic pain syndrome     4. Myofascial pain     5. Facet arthropathy         Mehnaz Soriano is a 61 y. o.female presenting to the pain clinic for evaluation of chronic pain in several joints. Patient states her biggest pain generator is her bilateral hips. Patient's history and physical consistent with bilateral SI joint dysfunction and pain. I have set her up for a bilateral SI joint injection with Dr. Devante Madera. I have started her on Mobic 7.5 mg daily, she is to take this with food. Patient advised to stop taking Aleve. We discussed that she has several pain generators and will have to address one at a time. Patient had a significant response to the cervical trigger point injections last visit. Her main pain generator today is her low back pain. Patient's history and physical consistent with lumbar facet mediated pain. We discussed the potential of doing a lumbar facet medial branch block at L4-5 and L5-S1. I referred her to physical therapy for her low back pain prior to pursuing any interventional procedures. We also discussed potential imaging such as an MRI for further evaluation of her increased leg weakness and burning into lateral hips. We discussed potential treatment of this with Lyrica. We will reconvene after completion of physical therapy to determine next steps. Patient also appears to have a fungal infection under her breasts. I have sent in nystatin powder for treatment of this. Patient advised to follow-up with her PCP if this gets worse or does not resolve with the nystatin. Plan: The following treatment recommendations and plan were discussed in detail with [facility]. Imaging:   Bilateral SI joint x-ray reviewed and discussed with the patient today. Analgesics:   Patient is taking Acetaminophen. Patient informed that the maximum amount of acetaminophen taken on a regular basis should only be 4000 mg per day. I have continued her Mobic 15mg daily. Patient is advised to take as prescribed and not take on an empty stomach. Adjuvants: For treatment of her musculoskeletal pain, the patient advised to continue tizanidine.     Interventions:   None    Anticoagulation/NPO Recommendations:   None    Multidisciplinary Pain Management:   In the presence of complex, chronic, and multi-factorial pain, the importance of a multidisciplinary approach to pain management in the patients management regimen was emphasized and discussed in great detail. PHYSICAL THERAPY: Patient is advised to see a physical therapist for gentle stretching exercises and conditioning exercises for management of pain.      Referrals:  Physical Therapy    Prescriptions Written This Visit:   Nystatin powder     Follow-up: 8 weeks    MARBIN Graham - CNP

## 2022-06-16 ENCOUNTER — TELEPHONE (OUTPATIENT)
Dept: PHYSICAL MEDICINE AND REHAB | Age: 57
End: 2022-06-16

## 2022-06-16 NOTE — TELEPHONE ENCOUNTER
Received plan of care for therapy from [facility] to be signed  By Gracia Nunez NP and faxed back. Completed and received confirmation they received fax. In media.

## 2022-07-26 RX ORDER — MELOXICAM 15 MG/1
TABLET ORAL
Qty: 30 TABLET | Refills: 2 | OUTPATIENT
Start: 2022-07-26

## 2022-08-04 ENCOUNTER — OFFICE VISIT (OUTPATIENT)
Dept: PHYSICAL MEDICINE AND REHAB | Age: 57
End: 2022-08-04
Payer: MEDICARE

## 2022-08-04 VITALS
WEIGHT: 219 LBS | BODY MASS INDEX: 36.49 KG/M2 | DIASTOLIC BLOOD PRESSURE: 86 MMHG | SYSTOLIC BLOOD PRESSURE: 124 MMHG | HEIGHT: 65 IN

## 2022-08-04 DIAGNOSIS — M53.3 SACROILIAC JOINT DYSFUNCTION OF BOTH SIDES: ICD-10-CM

## 2022-08-04 DIAGNOSIS — M25.561 CHRONIC PAIN OF RIGHT KNEE: Primary | ICD-10-CM

## 2022-08-04 DIAGNOSIS — M47.816 LUMBAR SPONDYLOSIS: ICD-10-CM

## 2022-08-04 DIAGNOSIS — G89.4 CHRONIC PAIN SYNDROME: ICD-10-CM

## 2022-08-04 DIAGNOSIS — G89.29 CHRONIC PAIN OF RIGHT KNEE: Primary | ICD-10-CM

## 2022-08-04 PROCEDURE — 99215 OFFICE O/P EST HI 40 MIN: CPT | Performed by: ANESTHESIOLOGY

## 2022-08-04 PROCEDURE — G8427 DOCREV CUR MEDS BY ELIG CLIN: HCPCS | Performed by: ANESTHESIOLOGY

## 2022-08-04 PROCEDURE — G8417 CALC BMI ABV UP PARAM F/U: HCPCS | Performed by: ANESTHESIOLOGY

## 2022-08-04 PROCEDURE — 4004F PT TOBACCO SCREEN RCVD TLK: CPT | Performed by: ANESTHESIOLOGY

## 2022-08-04 PROCEDURE — 3017F COLORECTAL CA SCREEN DOC REV: CPT | Performed by: ANESTHESIOLOGY

## 2022-08-04 NOTE — PROGRESS NOTES
Chronic Pain/PM&R Clinic Note     Encounter Date: 8/4/22    Subjective:   Chief Complaint:   Chief Complaint   Patient presents with    Follow-up       History of Present Illness:   Harjinder Dill is a 61 y.o. female seen in the clinic initially on 02/09/2022 upon request from [address], [place], DO for her history of bilateral knee, right shoulder and left elbow pain. Patient states her biggest pain generator at this time is her bilateral hips. Patient states she has had hip pain for over 10 years. She initially started seeing Dr. Kyra Rojo at [facility] in 2012 or 2013 for this issue. Patient states at that time she did have an MRI of her bilateral hips but she does not recall the results. She was never told that she had any significant findings. Patient states she currently uses a back brace to assist with her low back pain. She will only wear this when she is out and about or driving. Patient states last time she did physical therapy for her hips was around 2016. She states this was not beneficial.  Patient denies any recent imaging of her hips. Patient states she does have a history of degenerative disc disease. Patient states pain is aggravated with activity, standing, sitting. She states her right hip is significantly worse than her left. She cannot lay on her right side. Patient states she is not sleeping well due to pain. Patient states she takes Tylenol and Aleve which do help take the edge off. Patient states when she is in significant pain she has to sit down and rest.  He also provides minimal relief, while ice is ineffective. Patient denies use of an assistive device. She denies history of falls. Patient does state that she noticed that she limps when she walks, especially after increased activity. Patient states she is currently living at home with her boyfriend. Patient states she is not currently working as she is trying to get disability.   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 Patient denies associated leg weakness, saddle anesthesia, leg paresthesias, or bowel or bladder incontinence. Of note, patient has several pain generators such as bilateral knee pain, right shoulder pain, left elbow pain, low back pain. Patient denies history of fibromyalgia, any inflammatory conditions. But she does states she has diffuse joint pain. Patient brought in a list of prior diagnoses from Dr. Derrick Red. The list includes lumbar DDD, lumbar spondylosis, impingement syndrome of bilateral shoulders, osteoarthritis of left and right shoulder, lumbar spinal stenosis. She is also taking [address] from a dermatologist for history of psoriasis. Patient states she had surgery on her right shoulder in July 2018 where they placed 6 screws, this is due to her history with osteoarthritis. The surgery was done at [facility]. Today, 8/5/2022, patient presents for planned follow-up for chronic low back and knee pain. She continues to have a lot of ongoing multiple pain generators. She states that her most problematic pain generator at this point are her knees. Feels like both her knees are extremely painful and can cause her to have her knees give out due to pain. She states that she had a steroid injection in her right knee with a very minimal response from this in the past.  States she tends to get \"fluid\" in the right knee when she is participating physical therapy but feels like therapy has flared up her knee pain. She would like to try to address the knee pain and trying injection therapy for her knee pain. History of Interventions:   Surgery: No previous lumbar or hip surgeries  Right shoulder tendon and rotator cuff repair - hardware in place.    Injections: B/L SI joint injection (03/01/2022) - >85% relief  Cervical TPI (05/02/2022) - significant relief    Current Treatment Medications:   Alleve - takes the edge off  Tylenol - takes the edge off  Mobic 15 mg  Tizanidine 4 mg - effective    Historical Treatment Medications:   Diclofenac - effective at that time  Tramadol - effective at that time    Imaging:  Xray right shoulder (04/0/2022)        Past Medical History:   Diagnosis Date    Osteoarthritis        Past Surgical History:   Procedure Laterality Date    BACK INJECTION Bilateral 3/1/2022    Bilateral SI joint injection performed by Hayden Perez DO at [address] [place] [address] Bilateral     CATARACT REMOVAL Left     ROTATOR CUFF REPAIR Right     TUBAL LIGATION         History reviewed. No pertinent family history. Medications & Allergies:   Current Outpatient Medications   Medication Instructions    losartan (COZAAR) 50 mg tablet take 1 tablet by mouth once daily    meloxicam (MOBIC) 15 mg, Oral, DAILY    PROAIR  (90 Base) MCG/ACT inhaler inhale 2 puffs by mouth every 6 hours if needed    Taltz 80 mg, SubCUTAneous, ONCE       No Known Allergies    Review of Systems:   Constitutional: negative for weight changes or fevers  Genitourinary: negative for bowel/bladder incontinence   Musculoskeletal: positive for bilateral knee pain  Neurological: negative for any leg weakness or numbness/tingling  Behavioral/Psych: negative for anxiety/depression   All other systems reviewed and are negative    Objective:     Vitals:    08/04/22 1400   BP: 124/86       Constitutional: Pleasant, no acute distress   Head: Normocephalic, atraumatic   Eyes: Conjunctivae normal   Neck: Supple, symmetrical   Lungs: Normal respiratory effort, non-labored breathing   Cardiovascular: Limbs warm and well perfused   Abdomen: Non-protruded   Musculoskeletal: Muscle bulk symmetric, no atrophy, no gross deformities   Upper extremities: Right arm: Forward flexion 90%, Abduction 90%.  Positive lift-off test. Extreme tenderness with palpation of right shoulder, right trapezius, and right cervical paraspinals. · Lower Extremities: Tenderness palpation over medial and lateral joint lines of right knee. Appreciable crepitus with knee range of motion. Ligamentous structures intact. Neurological: Cranial nerves II-XII grossly intact. · Gait - Antalgic gait. Ambulates without assistive device. · Motor: No focal motor deficits appreciated lower limbs but appreciable giveaway weakness due to pain  · Sensory: LT sensation intact in lower limbs   Skin: No rashes or lesions present   Psychological: Cooperative, no exaggerated pain behaviors     Assessment:    Diagnosis Orders   1. Chronic pain of right knee  CHG FLUOR NEEDLE/CATH SPINE/PARASPINAL DX/THER ADDON    IL ARTHROCENTESIS ASPIR&/INJ MAJOR JT/BURSA W/O US      2. Sacroiliac joint dysfunction of both sides        3. Lumbar spondylosis        4. Chronic pain syndrome              Rosina Read is a 61 y. o.female presenting to the pain clinic for evaluation of chronic pain in several joints. Patient states her biggest pain generator is her bilateral hips. She has responded well to previous SI joint injections. She continues to have a lot of multiple joint issue pain. Her biggest pain generator at this point are her knees. I set her up for right intra-articular knee injection with Synvisc under fluoroscopic guidance. Plan: The following treatment recommendations and plan were discussed in detail with Rosina Read. Imaging:   I reviewed patient's knee x-rays and results were discussed with the patient in detail today. Analgesics:   Patient is taking Acetaminophen. Patient informed that the maximum amount of acetaminophen taken on a regular basis should only be 4000 mg per day. I have continued her Mobic 15mg daily. Patient is advised to take as prescribed and not take on an empty stomach. Adjuvants:    For treatment of her musculoskeletal pain, the patient advised to continue tizanidine. Interventions: For continued right knee pain secondary to osteoarthritis as evident on her knee x-rays in 2022, I set the patient up for a right knee injection with Synvisc. Anticoagulation/NPO Recommendations:   Patient does not need to hold any medications prior to procedure. Patient does not need to be NPO prior to the procedure. We will perform a local injection. Multidisciplinary Pain Management:   In the presence of complex, chronic, and multi-factorial pain, the importance of a multidisciplinary approach to pain management in the patients management regimen was emphasized and discussed in great detail. PHYSICAL THERAPY: Patient is advised to see a physical therapist for gentle stretching exercises and conditioning exercises for management of pain. Referrals:  None    Prescriptions Written This Visit:   None    Follow-up: For R knee synvisc inj    I spent 40 minutes with the patient and greater than 50% of this time was spent establishing care (patient new to me), discussing her clinical diagnosis of knee pain, discussing treatment options for knee pain including Synvisc injections and the risks involved with this, and discussed the importance of physical therapy in regards to her knee pain.       Candie Mackenzie, DO  Interventional Pain Management/PM&R   [place]

## 2022-08-08 NOTE — TELEPHONE ENCOUNTER
[employer] called requesting a refill on the following medications:  Requested Prescriptions     Pending Prescriptions Disp Refills    meloxicam (MOBIC) 15 MG tablet 30 tablet 2     Sig: Take 1 tablet by mouth in the morning. Pharmacy verified: [facility] in [place]   . pv      Date of last visit: 8/04/2022  Date of next visit (if applicable): 54/96/2691

## 2022-08-10 RX ORDER — MELOXICAM 15 MG/1
15 TABLET ORAL DAILY
Qty: 30 TABLET | Refills: 2 | Status: SHIPPED | OUTPATIENT
Start: 2022-08-10 | End: 2022-10-31

## 2022-08-10 NOTE — TELEPHONE ENCOUNTER
OARRS reviewed. UDS: negative   Last seen: 8/4/2022.  Follow-up:   Future Appointments   Date Time Provider Lorenzo Amie   10/27/2022 10:00 AM MARBIN Sanchez - CNP [facility] [address]

## 2022-08-26 ENCOUNTER — PREP FOR PROCEDURE (OUTPATIENT)
Dept: PHYSICAL MEDICINE AND REHAB | Age: 57
End: 2022-08-26

## 2022-08-29 NOTE — H&P
Today, patient presents for planned right knee injection with Synvisc. This note is reflective of the patient's previous visit for evaluation. We will proceed with today's planned procedure. Since patient's last visit for evaluation, there have been no interval changes in medical history. Patient has no new numbness, weakness, or focal neurological deficit since evaluation. Patient has no contraindications to injection (no anticoagulation or recent antibiotic intake for active infections), and has a  present or is able to drive themselves (as discussed and cleared by physician). Allergies to latex, contrast dye, and steroid medications have been confirmed with the patient prior to the procedure. NPO necessity has been assessed and accepted based on procedure complexity. The risks and benefits of the procedure have been explained including but are not limited to infection, bleeding, paralysis, immediate post procedure weakness, and dizziness; the patient acknowledges understanding and desires to proceed with the procedure. Patient has signed consent for same procedure as discussed in previous clinic encounter. All other questions and concerns were addressed at bedside. See procedure note for full details. Post procedure Instructions: The patient was advised not to drive during the day of the procedure and not to engage in any significant decision making (unless otherwise states by physician). The patient was also advised to be cautious with walking/activity for 24 hours following today's visit and asked not to engage in over-exertion (unless otherwise states by physician). After this time, it is ok to resume pre-procedure level of activity. Patient advised to apply ice to site of injection in situations of pain and discomfort. Patient advised to not submerge site of injection during bath or pool activities for approximately 24 hours post-procedure.  Patient attested to understanding post procedure working as she is trying to get disability. Patient used to work in the [employer] at [facility] where she did a lot of heavy lifting. 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 Patient denies associated leg weakness, saddle anesthesia, leg paresthesias, or bowel or bladder incontinence. Of note, patient has several pain generators such as bilateral knee pain, right shoulder pain, left elbow pain, low back pain. Patient denies history of fibromyalgia, any inflammatory conditions. But she does states she has diffuse joint pain. Patient brought in a list of prior diagnoses from Dr. Duke Crespo. The list includes lumbar DDD, lumbar spondylosis, impingement syndrome of bilateral shoulders, osteoarthritis of left and right shoulder, lumbar spinal stenosis. She is also taking Bree Gordon from a dermatologist for history of psoriasis. Patient states she had surgery on her right shoulder in July 2018 where they placed 6 screws, this is due to her history with osteoarthritis. The surgery was done at [facility]. Today, 8/5/2022, patient presents for planned follow-up for chronic low back and knee pain. She continues to have a lot of ongoing multiple pain generators. She states that her most problematic pain generator at this point are her knees. Feels like both her knees are extremely painful and can cause her to have her knees give out due to pain. She states that she had a steroid injection in her right knee with a very minimal response from this in the past.  States she tends to get \"fluid\" in the right knee when she is participating physical therapy but feels like therapy has flared up her knee pain. She would like to try to address the knee pain and trying injection therapy for her knee pain.     History of Interventions:   Surgery: No previous lumbar or hip surgeries  Right shoulder tendon and rotator cuff repair - hardware in place. Injections: B/L SI joint injection (03/01/2022) - >85% relief  Cervical TPI (05/02/2022) - significant relief    Current Treatment Medications:   Alleve - takes the edge off  Tylenol - takes the edge off  Mobic 15 mg  Tizanidine 4 mg - effective    Historical Treatment Medications:   Diclofenac - effective at that time  Tramadol - effective at that time    Imaging:  Xray right shoulder (04/0/2022)        Past Medical History:   Diagnosis Date    Osteoarthritis        Past Surgical History:   Procedure Laterality Date    BACK INJECTION Bilateral 3/1/2022    Bilateral SI joint injection performed by Peter Dumas DO at [address] [place] [address] Bilateral     CATARACT REMOVAL Left     ROTATOR CUFF REPAIR Right     TUBAL LIGATION         No family history on file. Medications & Allergies:   Current Outpatient Medications   Medication Instructions    losartan (COZAAR) 50 mg tablet take 1 tablet by mouth once daily    meloxicam (MOBIC) 15 mg, Oral, DAILY    PROAIR  (90 Base) MCG/ACT inhaler inhale 2 puffs by mouth every 6 hours if needed    Taltz 80 mg, SubCUTAneous, ONCE       No Known Allergies    Review of Systems:   Constitutional: negative for weight changes or fevers  Genitourinary: negative for bowel/bladder incontinence   Musculoskeletal: positive for bilateral knee pain  Neurological: negative for any leg weakness or numbness/tingling  Behavioral/Psych: negative for anxiety/depression   All other systems reviewed and are negative    Objective: There were no vitals filed for this visit.       Constitutional: Pleasant, no acute distress   Head: Normocephalic, atraumatic   Eyes: Conjunctivae normal   Neck: Supple, symmetrical   Lungs: Normal respiratory effort, non-labored breathing   Cardiovascular: Limbs warm and well perfused   Abdomen: Non-protruded   Musculoskeletal: Muscle bulk symmetric, no atrophy, no gross deformities   Upper extremities: Right arm: Forward flexion 90%, Abduction 90%. Positive lift-off test. Extreme tenderness with palpation of right shoulder, right trapezius, and right cervical paraspinals. · Lower Extremities: Tenderness palpation over medial and lateral joint lines of right knee. Appreciable crepitus with knee range of motion. Ligamentous structures intact. Neurological: Cranial nerves II-XII grossly intact. · Gait - Antalgic gait. Ambulates without assistive device. · Motor: No focal motor deficits appreciated lower limbs but appreciable giveaway weakness due to pain  · Sensory: LT sensation intact in lower limbs   Skin: No rashes or lesions present   Psychological: Cooperative, no exaggerated pain behaviors     Assessment:    Diagnosis Orders   1. Chronic pain of right knee  CHG FLUOR NEEDLE/CATH SPINE/PARASPINAL DX/THER ADDON    NC ARTHROCENTESIS ASPIR&/INJ MAJOR JT/BURSA W/O US      2. Sacroiliac joint dysfunction of both sides        3. Lumbar spondylosis        4. Chronic pain syndrome              Ranjan Mayfield is a 61 y. o.female presenting to the pain clinic for evaluation of chronic pain in several joints. Patient states her biggest pain generator is her bilateral hips. She has responded well to previous SI joint injections. She continues to have a lot of multiple joint issue pain. Her biggest pain generator at this point are her knees. I set her up for right intra-articular knee injection with Synvisc under fluoroscopic guidance. Plan: The following treatment recommendations and plan were discussed in detail with Ranjan Mayfield. Imaging:   I reviewed patient's knee x-rays and results were discussed with the patient in detail today. Analgesics:   Patient is taking Acetaminophen. Patient informed that the maximum amount of acetaminophen taken on a regular basis should only be 4000 mg per day. I have continued her Mobic 15mg daily.  Patient is advised to take as prescribed and not take on an empty stomach. Adjuvants: For treatment of her musculoskeletal pain, the patient advised to continue tizanidine. Interventions: For continued right knee pain secondary to osteoarthritis as evident on her knee x-rays in 2022, I set the patient up for a right knee injection with Synvisc. Anticoagulation/NPO Recommendations:   Patient does not need to hold any medications prior to procedure. Patient does not need to be NPO prior to the procedure. We will perform a local injection. Multidisciplinary Pain Management:   In the presence of complex, chronic, and multi-factorial pain, the importance of a multidisciplinary approach to pain management in the patients management regimen was emphasized and discussed in great detail. PHYSICAL THERAPY: Patient is advised to see a physical therapist for gentle stretching exercises and conditioning exercises for management of pain. Referrals:  None    Prescriptions Written This Visit:   None    Follow-up: For R knee synvisc inj    I spent 40 minutes with the patient and greater than 50% of this time was spent establishing care (patient new to me), discussing her clinical diagnosis of knee pain, discussing treatment options for knee pain including Synvisc injections and the risks involved with this, and discussed the importance of physical therapy in regards to her knee pain.       Nathen Olszewski, DO  Interventional Pain Management/PM&R   [place]

## 2022-08-29 NOTE — DISCHARGE INSTRUCTIONS

## 2022-08-30 ENCOUNTER — APPOINTMENT (OUTPATIENT)
Dept: GENERAL RADIOLOGY | Age: 57
End: 2022-08-30
Attending: ANESTHESIOLOGY
Payer: MEDICARE

## 2022-08-30 ENCOUNTER — HOSPITAL ENCOUNTER (OUTPATIENT)
Age: 57
Setting detail: OUTPATIENT SURGERY
Discharge: HOME | End: 2022-08-30
Attending: ANESTHESIOLOGY | Admitting: ANESTHESIOLOGY
Payer: MEDICARE

## 2022-08-30 VITALS
HEIGHT: 66 IN | SYSTOLIC BLOOD PRESSURE: 121 MMHG | TEMPERATURE: 97.2 F | HEART RATE: 69 BPM | RESPIRATION RATE: 16 BRPM | BODY MASS INDEX: 36.45 KG/M2 | OXYGEN SATURATION: 98 % | DIASTOLIC BLOOD PRESSURE: 60 MMHG | WEIGHT: 226.8 LBS

## 2022-08-30 PROCEDURE — 6360000002 HC RX W HCPCS: Performed by: ANESTHESIOLOGY

## 2022-08-30 PROCEDURE — 2500000003 HC RX 250 WO HCPCS: Performed by: ANESTHESIOLOGY

## 2022-08-30 PROCEDURE — 20610 DRAIN/INJ JOINT/BURSA W/O US: CPT | Performed by: ANESTHESIOLOGY

## 2022-08-30 PROCEDURE — 7100000010 HC PHASE II RECOVERY - FIRST 15 MIN: Performed by: ANESTHESIOLOGY

## 2022-08-30 PROCEDURE — 6360000004 HC RX CONTRAST MEDICATION: Performed by: ANESTHESIOLOGY

## 2022-08-30 PROCEDURE — 77002 NEEDLE LOCALIZATION BY XRAY: CPT | Performed by: ANESTHESIOLOGY

## 2022-08-30 PROCEDURE — 3600000054 HC PAIN LEVEL 3 BASE: Performed by: ANESTHESIOLOGY

## 2022-08-30 PROCEDURE — 3209999900 FLUORO FOR SURGICAL PROCEDURES

## 2022-08-30 RX ORDER — LIDOCAINE HYDROCHLORIDE 10 MG/ML
INJECTION, SOLUTION EPIDURAL; INFILTRATION; INTRACAUDAL; PERINEURAL PRN
Status: DISCONTINUED | OUTPATIENT
Start: 2022-08-30 | End: 2022-08-30 | Stop reason: ALTCHOICE

## 2022-08-30 ASSESSMENT — PAIN DESCRIPTION - DESCRIPTORS: DESCRIPTORS: ACHING;DULL

## 2022-08-30 ASSESSMENT — PAIN - FUNCTIONAL ASSESSMENT: PAIN_FUNCTIONAL_ASSESSMENT: 0-10

## 2022-08-30 NOTE — PROCEDURES
Pre-operative Diagnosis:  Right Knee pain     Post-operative Diagnosis: Right Knee pain     Procedure: Right intra-articular knee injection with Synvisc under fluoroscopic guidance     Procedure Description:  After having signed the informed consent, the patient was taken to the operating room placed in the supine position. The left knee was with ChloraPrep and draped in a sterile fashion the medial aspect of the Right knee was identified under fluoroscopic guidance. 2 cc of 1% lidocaine was used to anesthetize the skin and subcutaneous tissues. A 25-gauge 3-1/2 inch spinal needle was advanced into the knee joint. After negative aspiration 0.5 cc of Omnipaque 300 was injected which revealed appropriate spread in the joint. Then, 6 cc of Synvisc One was injected into the joint. The patient tolerated the procedure well.         Procedural Complications: None        Sergey Browne DO  Interventional Pain Management/PM&R  [place]

## 2022-08-30 NOTE — PROGRESS NOTES
1019-Patient to Phase II. Report received from [facility]. Patient awake and alert. Vital signs obtained and stable. Respirations unlabored on room air. Patient denies pain and nausea. Denies numbness and tingling in extremities. Injection site clean and intact with band aide. Patient instructed to get dress. Ambulated to car.

## 2022-08-30 NOTE — PROGRESS NOTES
Resting quietly in bed with call light in reach. Reviewed discharge instructions and voiced understanding.

## 2022-10-27 ENCOUNTER — OFFICE VISIT (OUTPATIENT)
Dept: PHYSICAL MEDICINE AND REHAB | Age: 57
End: 2022-10-27
Payer: MEDICARE

## 2022-10-27 VITALS
WEIGHT: 226 LBS | HEIGHT: 66 IN | SYSTOLIC BLOOD PRESSURE: 138 MMHG | BODY MASS INDEX: 36.32 KG/M2 | DIASTOLIC BLOOD PRESSURE: 84 MMHG

## 2022-10-27 DIAGNOSIS — M47.816 LUMBAR SPONDYLOSIS: Primary | ICD-10-CM

## 2022-10-27 DIAGNOSIS — M47.819 FACET ARTHROPATHY: ICD-10-CM

## 2022-10-27 DIAGNOSIS — M79.18 MYOFASCIAL PAIN: ICD-10-CM

## 2022-10-27 DIAGNOSIS — G89.4 CHRONIC PAIN SYNDROME: ICD-10-CM

## 2022-10-27 PROCEDURE — G8484 FLU IMMUNIZE NO ADMIN: HCPCS | Performed by: NURSE PRACTITIONER

## 2022-10-27 PROCEDURE — 3017F COLORECTAL CA SCREEN DOC REV: CPT | Performed by: NURSE PRACTITIONER

## 2022-10-27 PROCEDURE — G8427 DOCREV CUR MEDS BY ELIG CLIN: HCPCS | Performed by: NURSE PRACTITIONER

## 2022-10-27 PROCEDURE — G8417 CALC BMI ABV UP PARAM F/U: HCPCS | Performed by: NURSE PRACTITIONER

## 2022-10-27 PROCEDURE — 4004F PT TOBACCO SCREEN RCVD TLK: CPT | Performed by: NURSE PRACTITIONER

## 2022-10-27 PROCEDURE — 99204 OFFICE O/P NEW MOD 45 MIN: CPT | Performed by: NURSE PRACTITIONER

## 2022-10-27 RX ORDER — CLOBETASOL PROPIONATE 0.46 MG/ML
SOLUTION TOPICAL
COMMUNITY
Start: 2022-10-14

## 2022-10-27 NOTE — PROGRESS NOTES
Chronic Pain/PM&R Clinic Note     Encounter Date: 8/4/22    Subjective:   Chief Complaint:   Chief Complaint   Patient presents with    Follow Up After Procedure     right knee injection with Synvisc. 8/30/22       History of Present Illness:   Paul Clemens is a 61 y.o. female seen in the clinic initially on 02/09/2022 upon request from [address], [place], DO for her history of bilateral knee, right shoulder and left elbow pain. Patient states her biggest pain generator at this time is her bilateral hips. Patient states she has had hip pain for over 10 years. She initially started seeing Dr. Cherelle Lima at [facility] in 2012 or 2013 for this issue. Patient states at that time she did have an MRI of her bilateral hips but she does not recall the results. She was never told that she had any significant findings. Patient states she currently uses a back brace to assist with her low back pain. She will only wear this when she is out and about or driving. Patient states last time she did physical therapy for her hips was around 2016. She states this was not beneficial.  Patient denies any recent imaging of her hips. Patient states she does have a history of degenerative disc disease. Patient states pain is aggravated with activity, standing, sitting. She states her right hip is significantly worse than her left. She cannot lay on her right side. Patient states she is not sleeping well due to pain. Patient states she takes Tylenol and Aleve which do help take the edge off. Patient states when she is in significant pain she has to sit down and rest.  He also provides minimal relief, while ice is ineffective. Patient denies use of an assistive device. She denies history of falls. Patient does state that she noticed that she limps when she walks, especially after increased activity. Patient states she is currently living at home with her boyfriend.   Patient states she is not currently working as she is trying to get disability. Patient used to work in the [employer] at [facility] [employer] where she did a lot of heavy lifting. 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 Patient denies associated leg weakness, saddle anesthesia, leg paresthesias, or bowel or bladder incontinence. Of note, patient has several pain generators such as bilateral knee pain, right shoulder pain, left elbow pain, low back pain. Patient denies history of fibromyalgia, any inflammatory conditions. But she does states she has diffuse joint pain. Patient brought in a list of prior diagnoses from Dr. Cherelle Mann. The list includes lumbar DDD, lumbar spondylosis, impingement syndrome of bilateral shoulders, osteoarthritis of left and right shoulder, lumbar spinal stenosis. She is also taking Isabelle Trent from a dermatologist for history of psoriasis. Patient states she had surgery on her right shoulder in July 2018 where they placed 6 screws, this is due to her history with osteoarthritis. The surgery was done at [facility]. Today, 10/27/2022, patient presents for planned follow-up on chronic low back pain. Patient states she underwent physical therapy which was ineffective. She states it actually caused increased right knee pain which is why she underwent the right knee Synvisc injection on 8/30/2022. She has noticed significant relief of her right knee pain after this injection. Her mid low back continues to bother her, especially with activity. She states when she first stands up it locks up and feels stiff and it takes her a couple minutes to get moving. Leg paresthesias, saddle anesthesia, bowel/bladder incontinence. History of Interventions:   Surgery: No previous lumbar or hip surgeries  Right shoulder tendon and rotator cuff repair - hardware in place.    Injections: B/L SI joint injection (03/01/2022) - >85% relief  Cervical TPI (05/02/2022) - significant relief  R knee Synvisc injection (08/30/2022) - significant relief     Current Treatment Medications:   Alleve - takes the edge off  Tylenol - takes the edge off  Mobic 15 mg -   Tizanidine 4 mg - effective  THC- dulls her pain    Historical Treatment Medications:   Diclofenac - effective at that time  Tramadol - effective at that time    Imaging:  Xray right shoulder (04/0/2022)        Past Medical History:   Diagnosis Date    Osteoarthritis        Past Surgical History:   Procedure Laterality Date    BACK INJECTION Bilateral 3/1/2022    Bilateral SI joint injection performed by Hershal Lesch, DO at [address] [place] [address] Bilateral     CATARACT REMOVAL Left     HIP SURGERY Right 8/30/2022    right knee injection with Synvisc. performed by Hershal Lesch, DO at [address] [place] [address] Right     TUBAL LIGATION         No family history on file.       Medications & Allergies:   Current Outpatient Medications   Medication Instructions    clobetasol (TEMOVATE) 0.05 % external solution apply to affected area once daily    losartan (COZAAR) 50 mg tablet take 1 tablet by mouth once daily    meloxicam (MOBIC) 15 mg, Oral, DAILY    PROAIR  (90 Base) MCG/ACT inhaler inhale 2 puffs by mouth every 6 hours if needed       No Known Allergies    Review of Systems:   Constitutional: negative for weight changes or fevers  Genitourinary: negative for bowel/bladder incontinence   Musculoskeletal: positive for low back pain  Neurological: negative for any leg weakness or numbness/tingling  Behavioral/Psych: negative for anxiety/depression   All other systems reviewed and are negative    Objective:     Vitals:    10/27/22 1009   BP: 138/84       Constitutional: Pleasant, no acute distress   Head: Normocephalic, atraumatic   Eyes: Conjunctivae normal   Neck: Supple, symmetrical   Lungs: Normal respiratory effort, non-labored breathing   Cardiovascular: Limbs warm and well perfused Abdomen: Non-protruded   Musculoskeletal: Muscle bulk symmetric, no atrophy, no gross deformities   Upper extremities: Right arm: Forward flexion 90%, Abduction 90%. Positive lift-off test. Extreme tenderness with palpation of right shoulder, right trapezius, and right cervical paraspinals. · Lower Extremities: Tenderness palpation over medial and lateral joint lines of right knee. Appreciable crepitus with knee range of motion. Ligamentous structures intact. Lumbar: Tenderness with palpation. Positive facet loading. Negative straight leg test, bilaterally. Neurological: Cranial nerves II-XII grossly intact. · Gait - Antalgic gait. Ambulates without assistive device. · Motor: No focal motor deficits appreciated lower limbs but appreciable giveaway weakness due to pain  · Sensory: LT sensation intact in lower limbs   Skin: No rashes or lesions present   Psychological: Cooperative, no exaggerated pain behaviors     Assessment:    Diagnosis Orders   1. Lumbar spondylosis        2. Myofascial pain        3. Facet arthropathy        4. Chronic pain syndrome            Pat Kaur is a 61 y. o.female presenting to the pain clinic for evaluation of chronic pain in several joints. Patient states her biggest pain generator is her bilateral hips. She has responded well to previous SI joint injections. She continues to have a lot of multiple joint issue pain. Her biggest pain generator at this point are her knees. I set her up for right intra-articular knee injection with Synvisc under fluoroscopic guidance. Patient continues to deal with significant low back pain. We again discussed the option of a lumbar medial branch block to lumbar RFA. Patient is concerned about burning her nerves as if she were to get hurt in that area and not be able to feel the pain. We also discussed the potential of proceeding with a lumbar facet steroid injection.   I have advised her to stop the ibuprofen since I have her on Mobic. She will start taking Tylenol as she feels this has helped in the past.  Advised her to take a drug holiday from [facility] for few days to evaluate effectiveness. She can then restart if she notices increased pain. We will follow up in 4 weeks to discuss potential injections again. Plan: The following treatment recommendations and plan were discussed in detail with [facility]. Imaging:   I reviewed patient's knee x-rays and results were discussed with the patient in detail today. Analgesics:   Patient is taking Acetaminophen. Patient informed that the maximum amount of acetaminophen taken on a regular basis should only be 4000 mg per day. I have continued her Mobic 15mg daily. Patient is advised to take as prescribed and not take on an empty stomach. Adjuvants: For treatment of her musculoskeletal pain, the patient advised to continue tizanidine. Interventions:   None    Multidisciplinary Pain Management:   In the presence of complex, chronic, and multi-factorial pain, the importance of a multidisciplinary approach to pain management in the patients management regimen was emphasized and discussed in great detail. PHYSICAL THERAPY: Patient is advised to see a physical therapist for gentle stretching exercises and conditioning exercises for management of pain.      Referrals:  None    Prescriptions Written This Visit:   None    Follow-up: 4 weeks    MARBIN Lopez - HARIKA  Interventional Pain Management/PM&R   [place]

## 2022-10-31 RX ORDER — MELOXICAM 15 MG/1
TABLET ORAL
Qty: 30 TABLET | Refills: 2 | Status: SHIPPED | OUTPATIENT
Start: 2022-11-08 | End: 2023-02-06

## 2022-10-31 NOTE — TELEPHONE ENCOUNTER
OARRS reviewed. UDS: negative   Last seen: 8/4/2022.  Follow-up:   Future Appointments   Date Time Provider Lorenzo Amie   11/25/2022 10:30 AM MARIBN Chavez - CNP [facility] [address]

## 2022-11-15 ENCOUNTER — OFFICE VISIT (OUTPATIENT)
Dept: PHYSICAL MEDICINE AND REHAB | Age: 57
End: 2022-11-15
Payer: MEDICARE

## 2022-11-15 ENCOUNTER — APPOINTMENT (OUTPATIENT)
Dept: CT IMAGING | Age: 57
End: 2022-11-15
Payer: MEDICARE

## 2022-11-15 ENCOUNTER — APPOINTMENT (OUTPATIENT)
Dept: GENERAL RADIOLOGY | Age: 57
End: 2022-11-15
Payer: MEDICARE

## 2022-11-15 ENCOUNTER — HOSPITAL ENCOUNTER (EMERGENCY)
Age: 57
Discharge: HOME | End: 2022-11-15
Attending: EMERGENCY MEDICINE
Payer: MEDICARE

## 2022-11-15 VITALS
WEIGHT: 210 LBS | TEMPERATURE: 98.6 F | SYSTOLIC BLOOD PRESSURE: 126 MMHG | DIASTOLIC BLOOD PRESSURE: 90 MMHG | HEART RATE: 73 BPM | RESPIRATION RATE: 16 BRPM | OXYGEN SATURATION: 96 % | HEIGHT: 66 IN | BODY MASS INDEX: 33.75 KG/M2

## 2022-11-15 VITALS
HEIGHT: 66 IN | WEIGHT: 226 LBS | DIASTOLIC BLOOD PRESSURE: 82 MMHG | SYSTOLIC BLOOD PRESSURE: 140 MMHG | BODY MASS INDEX: 36.32 KG/M2

## 2022-11-15 DIAGNOSIS — R55 SYNCOPE AND COLLAPSE: ICD-10-CM

## 2022-11-15 DIAGNOSIS — G89.4 CHRONIC PAIN SYNDROME: ICD-10-CM

## 2022-11-15 DIAGNOSIS — S06.0X1A CONCUSSION WITH LOSS OF CONSCIOUSNESS OF 30 MINUTES OR LESS, INITIAL ENCOUNTER: Primary | ICD-10-CM

## 2022-11-15 DIAGNOSIS — M53.3 SACROILIAC JOINT DYSFUNCTION OF BOTH SIDES: ICD-10-CM

## 2022-11-15 DIAGNOSIS — M54.50 CHRONIC BILATERAL LOW BACK PAIN, UNSPECIFIED WHETHER SCIATICA PRESENT: Primary | ICD-10-CM

## 2022-11-15 DIAGNOSIS — G89.29 CHRONIC BILATERAL LOW BACK PAIN, UNSPECIFIED WHETHER SCIATICA PRESENT: Primary | ICD-10-CM

## 2022-11-15 LAB
ALBUMIN SERPL-MCNC: 4.1 G/DL (ref 3.5–5.1)
ALP BLD-CCNC: 79 U/L (ref 38–126)
ALT SERPL-CCNC: 14 U/L (ref 11–66)
ANION GAP SERPL CALCULATED.3IONS-SCNC: 9 MEQ/L (ref 8–16)
AST SERPL-CCNC: 14 U/L (ref 5–40)
BASOPHILS # BLD: 1.5 %
BASOPHILS ABSOLUTE: 0.1 THOU/MM3 (ref 0–0.1)
BILIRUB SERPL-MCNC: 0.2 MG/DL (ref 0.3–1.2)
BUN BLDV-MCNC: 12 MG/DL (ref 7–22)
CALCIUM SERPL-MCNC: 9.5 MG/DL (ref 8.5–10.5)
CHLORIDE BLD-SCNC: 105 MEQ/L (ref 98–111)
CO2: 24 MEQ/L (ref 23–33)
CREAT SERPL-MCNC: 1 MG/DL (ref 0.4–1.2)
EKG ATRIAL RATE: 65 BPM
EKG P AXIS: 36 DEGREES
EKG P-R INTERVAL: 180 MS
EKG Q-T INTERVAL: 404 MS
EKG QRS DURATION: 80 MS
EKG QTC CALCULATION (BAZETT): 420 MS
EKG R AXIS: 8 DEGREES
EKG T AXIS: 32 DEGREES
EKG VENTRICULAR RATE: 65 BPM
EOSINOPHIL # BLD: 1.6 %
EOSINOPHILS ABSOLUTE: 0.1 THOU/MM3 (ref 0–0.4)
ERYTHROCYTE [DISTWIDTH] IN BLOOD BY AUTOMATED COUNT: 14.4 % (ref 11.5–14.5)
ERYTHROCYTE [DISTWIDTH] IN BLOOD BY AUTOMATED COUNT: 48.1 FL (ref 35–45)
GFR SERPL CREATININE-BSD FRML MDRD: > 60 ML/MIN/1.73M2
GLUCOSE BLD-MCNC: 129 MG/DL (ref 70–108)
HCT VFR BLD CALC: 41.9 % (ref 37–47)
HEMOGLOBIN: 13.4 GM/DL (ref 12–16)
IMMATURE GRANS (ABS): 0.02 THOU/MM3 (ref 0–0.07)
IMMATURE GRANULOCYTES: 0.3 %
LYMPHOCYTES # BLD: 31.2 %
LYMPHOCYTES ABSOLUTE: 2.3 THOU/MM3 (ref 1–4.8)
MCH RBC QN AUTO: 29.1 PG (ref 26–33)
MCHC RBC AUTO-ENTMCNC: 32 GM/DL (ref 32.2–35.5)
MCV RBC AUTO: 91.1 FL (ref 81–99)
MONOCYTES # BLD: 6.4 %
MONOCYTES ABSOLUTE: 0.5 THOU/MM3 (ref 0.4–1.3)
NUCLEATED RED BLOOD CELLS: 0 /100 WBC
OSMOLALITY CALCULATION: 277.1 MOSMOL/KG (ref 275–300)
PLATELET # BLD: 341 THOU/MM3 (ref 130–400)
PMV BLD AUTO: 9.9 FL (ref 9.4–12.4)
POTASSIUM REFLEX MAGNESIUM: 4.2 MEQ/L (ref 3.5–5.2)
RBC # BLD: 4.6 MILL/MM3 (ref 4.2–5.4)
SEG NEUTROPHILS: 59 %
SEGMENTED NEUTROPHILS ABSOLUTE COUNT: 4.4 THOU/MM3 (ref 1.8–7.7)
SODIUM BLD-SCNC: 138 MEQ/L (ref 135–145)
TOTAL PROTEIN: 7.5 G/DL (ref 6.1–8)
TROPONIN T: < 0.01 NG/ML
WBC # BLD: 7.4 THOU/MM3 (ref 4.8–10.8)

## 2022-11-15 PROCEDURE — 6370000000 HC RX 637 (ALT 250 FOR IP): Performed by: EMERGENCY MEDICINE

## 2022-11-15 PROCEDURE — G8484 FLU IMMUNIZE NO ADMIN: HCPCS | Performed by: NURSE PRACTITIONER

## 2022-11-15 PROCEDURE — 4004F PT TOBACCO SCREEN RCVD TLK: CPT | Performed by: NURSE PRACTITIONER

## 2022-11-15 PROCEDURE — 85025 COMPLETE CBC W/AUTO DIFF WBC: CPT

## 2022-11-15 PROCEDURE — 96372 THER/PROPH/DIAG INJ SC/IM: CPT

## 2022-11-15 PROCEDURE — G8417 CALC BMI ABV UP PARAM F/U: HCPCS | Performed by: NURSE PRACTITIONER

## 2022-11-15 PROCEDURE — 36415 COLL VENOUS BLD VENIPUNCTURE: CPT

## 2022-11-15 PROCEDURE — 73030 X-RAY EXAM OF SHOULDER: CPT

## 2022-11-15 PROCEDURE — 80053 COMPREHEN METABOLIC PANEL: CPT

## 2022-11-15 PROCEDURE — 93005 ELECTROCARDIOGRAM TRACING: CPT | Performed by: PHYSICIAN ASSISTANT

## 2022-11-15 PROCEDURE — 3017F COLORECTAL CA SCREEN DOC REV: CPT | Performed by: NURSE PRACTITIONER

## 2022-11-15 PROCEDURE — 84484 ASSAY OF TROPONIN QUANT: CPT

## 2022-11-15 PROCEDURE — 70450 CT HEAD/BRAIN W/O DYE: CPT

## 2022-11-15 PROCEDURE — 73502 X-RAY EXAM HIP UNI 2-3 VIEWS: CPT

## 2022-11-15 PROCEDURE — 99285 EMERGENCY DEPT VISIT HI MDM: CPT

## 2022-11-15 PROCEDURE — G8427 DOCREV CUR MEDS BY ELIG CLIN: HCPCS | Performed by: NURSE PRACTITIONER

## 2022-11-15 PROCEDURE — 6360000002 HC RX W HCPCS: Performed by: EMERGENCY MEDICINE

## 2022-11-15 PROCEDURE — 93010 ELECTROCARDIOGRAM REPORT: CPT | Performed by: INTERNAL MEDICINE

## 2022-11-15 PROCEDURE — 99214 OFFICE O/P EST MOD 30 MIN: CPT | Performed by: NURSE PRACTITIONER

## 2022-11-15 PROCEDURE — 71045 X-RAY EXAM CHEST 1 VIEW: CPT

## 2022-11-15 RX ORDER — CLOBETASOL PROPIONATE 0.5 MG/G
CREAM TOPICAL 2 TIMES DAILY
COMMUNITY

## 2022-11-15 RX ORDER — KETOROLAC TROMETHAMINE 30 MG/ML
30 INJECTION, SOLUTION INTRAMUSCULAR; INTRAVENOUS ONCE
Status: COMPLETED | OUTPATIENT
Start: 2022-11-15 | End: 2022-11-15

## 2022-11-15 RX ORDER — LORAZEPAM 1 MG/1
1 TABLET ORAL ONCE
Status: COMPLETED | OUTPATIENT
Start: 2022-11-15 | End: 2022-11-15

## 2022-11-15 RX ADMIN — LORAZEPAM 1 MG: 1 TABLET ORAL at 10:24

## 2022-11-15 RX ADMIN — KETOROLAC TROMETHAMINE 30 MG: 30 INJECTION, SOLUTION INTRAMUSCULAR at 10:24

## 2022-11-15 ASSESSMENT — ENCOUNTER SYMPTOMS
DIARRHEA: 0
EYE PAIN: 0
SHORTNESS OF BREATH: 0
RHINORRHEA: 0
COLOR CHANGE: 0
EYE REDNESS: 0
VOMITING: 0

## 2022-11-15 ASSESSMENT — PAIN SCALES - GENERAL: PAINLEVEL_OUTOF10: 6

## 2022-11-15 ASSESSMENT — PAIN DESCRIPTION - LOCATION: LOCATION: HEAD

## 2022-11-15 ASSESSMENT — PAIN - FUNCTIONAL ASSESSMENT: PAIN_FUNCTIONAL_ASSESSMENT: 0-10

## 2022-11-15 NOTE — ED TRIAGE NOTES
Presents to ED with c/o loss of consciousness around 2130. States she felt like she had a hot flash and passed out. Patient hit head. C/o head, right shoulder, and right hip pain. Upon arrival to ED patient alert and oriented. Respirations easy and unlabored.

## 2022-11-15 NOTE — ED PROVIDER NOTES
[facility] ENCOUNTER          Pt Name: Terrance Doyle  MRN: [id number]  [place] 1965  Date of evaluation: 11/15/2022  Treating Resident Physician: Luis Benson DO  Supervising Physician: Kimberly Chris DO    History obtained from the patient. CHIEF COMPLAINT       Chief Complaint   Patient presents with    Loss of Consciousness           HISTORY OF PRESENT ILLNESS    HPI  Terrance Doyle is a 61 y.o. female who presents to the emergency department for evaluation of an episode of syncope last night at 2130. Patient has been walking around the house doing chores when she suddenly felt a hot flash and syncopized. She hit her head against the wall and then her right shoulder and right hip landed on the floor. Per her  she was out for about 30 seconds and then woke up and was initially little groggy but quickly came to. Patient did not have any episodes of vomiting although she felt nauseous shortly after. Since then she has been complaining of right hip and right shoulder pain. Today she has gradually developed a headache. At baseline she has decreased range of motion in her right shoulder secondary to surgery and a rotator cuff injury. Patient denies chest pain, difficulty breathing, weakness, numbness, dizziness. She has been under a lot of stress lately, due to her mom being in the hospital.  The patient has no other acute complaints at this time. REVIEW OF SYSTEMS   Review of Systems   Constitutional:  Negative for chills and fever. HENT:  Negative for congestion and rhinorrhea. Eyes:  Negative for pain and redness. Respiratory:  Negative for shortness of breath. Cardiovascular:  Negative for chest pain and palpitations. Gastrointestinal:  Negative for diarrhea and vomiting. Genitourinary:  Negative for difficulty urinating and dysuria. Musculoskeletal:  Positive for arthralgias and neck pain.  Negative for gait problem and joint swelling. Skin:  Negative for color change and pallor. Neurological:  Positive for syncope and headaches. Negative for facial asymmetry and speech difficulty. PAST MEDICAL AND SURGICAL HISTORY     Past Medical History:   Diagnosis Date    Osteoarthritis      Past Surgical History:   Procedure Laterality Date    BACK INJECTION Bilateral 3/1/2022    Bilateral SI joint injection performed by Logan Dixon DO at [address] [place] [address] Bilateral     CATARACT REMOVAL Left     HIP SURGERY Right 8/30/2022    right knee injection with Synvisc. performed by Logan Dixon DO at [address] [place] [address] Right     TUBAL LIGATION           MEDICATIONS   No current facility-administered medications for this encounter. Current Outpatient Medications:     clobetasol (TEMOVATE) 0.05 % cream, Apply topically 2 times daily Apply topically 2 times daily. , Disp: , Rfl:     meloxicam (MOBIC) 15 MG tablet, take 1 tablet by mouth every morning, Disp: 30 tablet, Rfl: 2    clobetasol (TEMOVATE) 0.05 % external solution, apply to affected area once daily, Disp: , Rfl:     losartan (COZAAR) 50 mg tablet, take 1 tablet by mouth once daily, Disp: , Rfl:     PROAIR  (90 Base) MCG/ACT inhaler, inhale 2 puffs by mouth every 6 hours if needed, Disp: , Rfl:       SOCIAL HISTORY     Social History     Social History Narrative    Not on file     Social History     Tobacco Use    Smoking status: Every Day     Packs/day: 0.50     Types: Cigarettes    Smokeless tobacco: Never   Vaping Use    Vaping Use: Never used   Substance Use Topics    Drug use: Yes     Types: Marijuana Everardo Spina)     Comment: medical marijuana card 2/28/22 last use         ALLERGIES   No Known Allergies      FAMILY HISTORY   History reviewed. No pertinent family history. PREVIOUS RECORDS   Previous records reviewed:  This is this patient's first visit to [facility] ED, no previous records available on EMR. .        PHYSICAL EXAM     ED Triage Vitals [11/15/22 0956]   BP Temp Temp Source Heart Rate Resp SpO2 Height Weight   136/88 98.6 °F (37 °C) Oral 83 16 100 % 5' 6\" (1.676 m) 210 lb (95.3 kg)     Initial vital signs and nursing assessment reviewed and normal. Body mass index is 33.89 kg/m². Pulsoximetry is normal per my interpretation. Additional Vital Signs:  Vitals:    11/15/22 1129   BP: (!) 126/90   Pulse: 73   Resp: 16   Temp:    SpO2: 96%       Physical Exam  Vitals and nursing note reviewed. Constitutional:       General: She is not in acute distress. Appearance: She is not ill-appearing or toxic-appearing. Comments: Tearful   HENT:      Head: Normocephalic and atraumatic. Right Ear: External ear normal.      Left Ear: External ear normal.      Nose: Nose normal. No congestion. Mouth/Throat:      Mouth: Mucous membranes are moist.      Pharynx: Oropharynx is clear. Eyes:      Conjunctiva/sclera: Conjunctivae normal.   Neck:      Comments: Left-sided paraspinal tenderness. No midline tenderness, no step-offs  Cardiovascular:      Rate and Rhythm: Normal rate and regular rhythm. Pulses: Normal pulses. Heart sounds: Normal heart sounds. Pulmonary:      Effort: Pulmonary effort is normal. No respiratory distress. Breath sounds: Normal breath sounds. No wheezing. Abdominal:      General: There is no distension. Palpations: Abdomen is soft. Tenderness: There is no abdominal tenderness. There is no guarding. Musculoskeletal:      Cervical back: Normal range of motion and neck supple. Comments: Decreased range of motion in the right shoulder. Tenderness over the border of the right clavicle. Pelvis is stable, right-sided pelvis is tender to palpation. Lymphadenopathy:      Cervical: No cervical adenopathy. Skin:     General: Skin is warm and dry. Capillary Refill: Capillary refill takes less than 2 seconds.    Neurological:      General: No focal deficit present. Mental Status: She is alert and oriented to person, place, and time. Psychiatric:         Mood and Affect: Mood normal.           MEDICAL DECISION MAKING   Initial Assessment:   Patient is a nontoxic-appearing, but anxious 58-year-old female with a history of HTN who presents after episode of syncope last night. Given the feeling of a hot flash before and her fairly quickly coming to afterwards, I think is unlikely to be a seizure, she has no history of seizures. I suspect this is most likely vasovagal syncope. Orthostatic vital signs are negative. Patient is able to ambulate without difficulty. She does have some right hip and right scapular tenderness, I think is unlikely she has fractured her pelvis. Based on the [address] [place] [address] it is unnecessary to get a CT Head. Her headache improved with Toradol and Ativan, Pt is looking better. Based upon clinical suspicion we decided to get a CT Head. It did not show any active bleeding, we will have the patient f/u on an outpatient basis. Ddx: Vasovagal syncope, concussion, orthostatic syncope, arrhythmia, pelvis fracture, scapula fracture, ICH  Plan:   Labs. Toradol  Ativan  CXR  XR R shoulder and R pelvis        ED RESULTS   Laboratory results:  Labs Reviewed   CBC WITH AUTO DIFFERENTIAL - Abnormal; Notable for the following components:       Result Value    MCHC 32.0 (*)     RDW-SD 48.1 (*)     All other components within normal limits   COMPREHENSIVE METABOLIC PANEL W/ REFLEX TO MG FOR LOW K - Abnormal; Notable for the following components:    Glucose 129 (*)     Total Bilirubin 0.2 (*)     All other components within normal limits   TROPONIN   GLOMERULAR FILTRATION RATE, ESTIMATED   ANION GAP   OSMOLALITY       Radiologic studies results:  CT Head W/O Contrast   Final Result      1. Mild atrophy. 2. Probable ischemic changes in the white matter. 3. Otherwise negative noncontrast CT scan of the brain.          **This electronically transcribed, and parts may have been transcribed with the assistance of an ED scribe. The transcription may contain errors not detected in proofreading. Please refer to my supervising physician's documentation if my documentation differs.     Electronically Signed: Beni Merino DO, 11/15/22, 12:37 PM        Beni Merino DO  Resident  11/15/22 0459

## 2022-11-15 NOTE — PROGRESS NOTES
Chronic Pain/PM&R Clinic Note     Encounter Date: 8/4/22    Subjective:   Chief Complaint:   Chief Complaint   Patient presents with    Follow-up       History of Present Illness:   Prince Wood is a 61 y.o. female seen in the clinic initially on 02/09/2022 upon request from [address], [place], DO for her history of bilateral knee, right shoulder and left elbow pain. Patient states her biggest pain generator at this time is her bilateral hips. Patient states she has had hip pain for over 10 years. She initially started seeing Dr. Amalia Rosales at [facility] in 2012 or 2013 for this issue. Patient states at that time she did have an MRI of her bilateral hips but she does not recall the results. She was never told that she had any significant findings. Patient states she currently uses a back brace to assist with her low back pain. She will only wear this when she is out and about or driving. Patient states last time she did physical therapy for her hips was around 2016. She states this was not beneficial.  Patient denies any recent imaging of her hips. Patient states she does have a history of degenerative disc disease. Patient states pain is aggravated with activity, standing, sitting. She states her right hip is significantly worse than her left. She cannot lay on her right side. Patient states she is not sleeping well due to pain. Patient states she takes Tylenol and Aleve which do help take the edge off. Patient states when she is in significant pain she has to sit down and rest.  He also provides minimal relief, while ice is ineffective. Patient denies use of an assistive device. She denies history of falls. Patient does state that she noticed that she limps when she walks, especially after increased activity. Patient states she is currently living at home with her boyfriend. Patient states she is not currently working as she is trying to get disability.   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 Patient denies associated leg weakness, saddle anesthesia, leg paresthesias, or bowel or bladder incontinence. Of note, patient has several pain generators such as bilateral knee pain, right shoulder pain, left elbow pain, low back pain. Patient denies history of fibromyalgia, any inflammatory conditions. But she does states she has diffuse joint pain. Patient brought in a list of prior diagnoses from Dr. Jonatan Baptiste. The list includes lumbar DDD, lumbar spondylosis, impingement syndrome of bilateral shoulders, osteoarthritis of left and right shoulder, lumbar spinal stenosis. She is also taking Anthony Kitchen from a dermatologist for history of psoriasis. Patient states she had surgery on her right shoulder in July 2018 where they placed 6 screws, this is due to her history with osteoarthritis. The surgery was done at [facility]. Today, 11/15/2022, patient presents for planned follow up on chronic low back pain. Patient states she stopped her mobic for 3 days and noticed significant increased pain so she has started it again. She brought her significant other with her today as she would like to discuss options for her low back pain with him present. In addition to her back pain, patient states she black out last night and fell. She hit the back of her head, her right shoulder, and her right low back/hip. She states her passing out is not normal for her. She did not seek medical care at the time of the incident. Patient is crying during our discussion. When asked why she was crying she states she does not feel right, she is finding it hard to concentrate. Her significant other states that she has not been acting like herself today. She denies any chest pain or weakness. She denies personal or family history of MI or stroke.        History of Interventions:   Surgery: No previous lumbar or hip surgeries  Right shoulder tendon and rotator cuff repair - hardware in place. Injections: B/L SI joint injection (03/01/2022) - >85% relief  Cervical TPI (05/02/2022) - significant relief  R knee Synvisc injection (08/30/2022) - significant relief     Current Treatment Medications:   Tylenol - takes the edge off  Mobic 15 mg - effective  Tizanidine 4 mg - effective  THC- dulls her pain    Historical Treatment Medications:   Diclofenac - effective at that time  Tramadol - effective at that time  Alleve - takes the edge off    Imaging:  Lumbar xray ordered    Past Medical History:   Diagnosis Date    Osteoarthritis        Past Surgical History:   Procedure Laterality Date    BACK INJECTION Bilateral 3/1/2022    Bilateral SI joint injection performed by Adrian Millan DO at [address] [place] [address] Bilateral     CATARACT REMOVAL Left     HIP SURGERY Right 8/30/2022    right knee injection with Synvisc. performed by Adrian Millan DO at [address] [place] [address] Right     TUBAL LIGATION         No family history on file. Medications & Allergies:   Current Outpatient Medications   Medication Instructions    clobetasol (TEMOVATE) 0.05 % cream Topical, 2 TIMES DAILY, Apply topically 2 times daily.     clobetasol (TEMOVATE) 0.05 % external solution apply to affected area once daily    losartan (COZAAR) 50 mg tablet take 1 tablet by mouth once daily    meloxicam (MOBIC) 15 MG tablet take 1 tablet by mouth every morning    PROAIR  (90 Base) MCG/ACT inhaler inhale 2 puffs by mouth every 6 hours if needed       No Known Allergies    Review of Systems:   Constitutional: negative for weight changes or fevers  Genitourinary: negative for bowel/bladder incontinence   Musculoskeletal: positive for low back pain  Neurological: negative for any leg weakness or numbness/tingling  Behavioral/Psych: negative for anxiety/depression   All other systems reviewed and are negative    Objective:     Vitals:    11/15/22 0838   BP: (!) 140/82       Constitutional: Pleasant, no acute distress   Head: Normocephalic, atraumatic   Eyes: Conjunctivae normal   Neck: Supple, symmetrical   Lungs: Normal respiratory effort, non-labored breathing   Cardiovascular: Limbs warm and well perfused   Abdomen: Non-protruded   Musculoskeletal: Muscle bulk symmetric, no atrophy, no gross deformities   Upper extremities: Right arm: Forward flexion 90%, Abduction 90%. Positive lift-off test. Extreme tenderness with palpation of right shoulder, right trapezius, and right cervical paraspinals. · Lower Extremities: Tenderness palpation over medial and lateral joint lines of right knee. Appreciable crepitus with knee range of motion. Ligamentous structures intact. Lumbar: Tenderness with palpation. Positive facet loading. Negative straight leg test, bilaterally. Neurological: Cranial nerves II-XII grossly intact. · Gait - Antalgic gait. Ambulates without assistive device. · Motor: No focal motor deficits appreciated lower limbs but appreciable giveaway weakness due to pain  · Sensory: LT sensation intact in lower limbs   Skin: No rashes or lesions present   Psychological: Cooperative, no exaggerated pain behaviors     Assessment:    Diagnosis Orders   1. Chronic bilateral low back pain, unspecified whether sciatica present  XR LUMBAR SPINE (MIN 4 VIEWS)      2. Chronic pain syndrome        3. Sacroiliac joint dysfunction of both sides          Ermias Thomas is a 61 y. o.female presenting to the pain clinic for evaluation of chronic pain in several joints. Patient states her biggest pain generator is her bilateral hips. She has responded well to previous SI joint injections. She continues to have a lot of multiple joint issue pain. Her biggest pain generator at this point are her knees.   I set her up for right intra-articular knee injection with Synvisc under fluoroscopic guidance. I discussed options for treatment of her low back pain with the patient and her significant other, including lumbar facet steroid injection and lumbar MBB to RFA. I have continued her Mobic. I have also ordered a lumbar xray for updated imaging. I have recommended she go to the ER for evaluation of her altered mental status after a fall last night. I recommended we call the squad, patient declined and states she would go straight to the ER. Patient advised to call the office after discharged to either schedule an appointment with me or to set up a procedure. Plan: The following treatment recommendations and plan were discussed in detail with [facility]. Imaging:   Lumbar xray ordered    Analgesics:   Patient is taking Acetaminophen. Patient informed that the maximum amount of acetaminophen taken on a regular basis should only be 4000 mg per day. I have continued her Mobic 15mg daily. Patient is advised to take as prescribed and not take on an empty stomach. Adjuvants: For treatment of her musculoskeletal pain, the patient advised to continue tizanidine. Interventions:   None    Multidisciplinary Pain Management:   In the presence of complex, chronic, and multi-factorial pain, the importance of a multidisciplinary approach to pain management in the patients management regimen was emphasized and discussed in great detail. PHYSICAL THERAPY: Patient is advised to see a physical therapist for gentle stretching exercises and conditioning exercises for management of pain.      Referrals:  ER  Lumbar xray    Prescriptions Written This Visit:   None    Follow-up: Patient to call    MARBIN Dle Rio CNP  Interventional Pain Management/PM&R   [place]

## 2022-11-15 NOTE — DISCHARGE INSTRUCTIONS
Take Tylenol 650 mg every 4 hours as needed. Follow-up with your primary care doctor within next couple days. Return here if symptoms worsen. Return here if you have difficulty walking or weakness in arm or leg or difficulty speaking.

## 2022-12-09 ENCOUNTER — OFFICE VISIT (OUTPATIENT)
Dept: PHYSICAL MEDICINE AND REHAB | Age: 57
End: 2022-12-09

## 2022-12-09 VITALS
BODY MASS INDEX: 33.75 KG/M2 | DIASTOLIC BLOOD PRESSURE: 76 MMHG | WEIGHT: 210 LBS | HEIGHT: 66 IN | SYSTOLIC BLOOD PRESSURE: 126 MMHG

## 2022-12-09 DIAGNOSIS — M47.819 FACET ARTHROPATHY: ICD-10-CM

## 2022-12-09 DIAGNOSIS — M53.3 SACROILIAC JOINT DYSFUNCTION OF BOTH SIDES: ICD-10-CM

## 2022-12-09 DIAGNOSIS — M47.816 LUMBAR SPONDYLOSIS: Primary | ICD-10-CM

## 2022-12-09 DIAGNOSIS — G89.4 CHRONIC PAIN SYNDROME: ICD-10-CM

## 2022-12-09 DIAGNOSIS — M25.50 ARTHRALGIA, UNSPECIFIED JOINT: ICD-10-CM

## 2022-12-09 RX ORDER — LOSARTAN POTASSIUM 100 MG/1
100 TABLET ORAL DAILY
COMMUNITY
Start: 2022-12-07

## 2022-12-09 NOTE — PATIENT INSTRUCTIONS
[address]  Ph: [phone]    [facility] ([facility])  Address: [place] [address]. GERARDO Reed AM, II.[facility], [address]  Phone: [phone]

## 2022-12-09 NOTE — PROGRESS NOTES
Chronic Pain/PM&R Clinic Note     Encounter Date: 8/4/22    Subjective:   Chief Complaint:   Chief Complaint   Patient presents with    Follow-up     Dr Moriah Castro upped BP pills to 100mg from 50mg  Also labs EKG and xray of knee       History of Present Illness:   Saskia Medrano is a 61 y.o. female seen in the clinic initially on 02/09/2022 upon request from Antonieta Lien, [place], DO for her history of bilateral knee, right shoulder and left elbow pain. Patient states her biggest pain generator at this time is her bilateral hips. Patient states she has had hip pain for over 10 years. She initially started seeing Dr. Shonda Worthy at [facility] in 2012 or 2013 for this issue. Patient states at that time she did have an MRI of her bilateral hips but she does not recall the results. She was never told that she had any significant findings. Patient states she currently uses a back brace to assist with her low back pain. She will only wear this when she is out and about or driving. Patient states last time she did physical therapy for her hips was around 2016. She states this was not beneficial.  Patient denies any recent imaging of her hips. Patient states she does have a history of degenerative disc disease. Patient states pain is aggravated with activity, standing, sitting. She states her right hip is significantly worse than her left. She cannot lay on her right side. Patient states she is not sleeping well due to pain. Patient states she takes Tylenol and Aleve which do help take the edge off. Patient states when she is in significant pain she has to sit down and rest.  He also provides minimal relief, while ice is ineffective. Patient denies use of an assistive device. She denies history of falls. Patient does state that she noticed that she limps when she walks, especially after increased activity. Patient states she is currently living at home with her boyfriend.   Patient states she is not currently working as she is trying to get disability. Patient used to work in the [employer] where she did a lot of heavy lifting. 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 Patient denies associated leg weakness, saddle anesthesia, leg paresthesias, or bowel or bladder incontinence. Of note, patient has several pain generators such as bilateral knee pain, right shoulder pain, left elbow pain, low back pain. Patient denies history of fibromyalgia, any inflammatory conditions. But she does states she has diffuse joint pain. Patient brought in a list of prior diagnoses from Dr. Shonda Worthy. The list includes lumbar DDD, lumbar spondylosis, impingement syndrome of bilateral shoulders, osteoarthritis of left and right shoulder, lumbar spinal stenosis. She is also taking Dolphus Fortune from a dermatologist for history of psoriasis. Patient states she had surgery on her right shoulder in July 2018 where they placed 6 screws, this is due to her history with osteoarthritis. The surgery was done at [facility]. Today, 12/9/2022, patient presents for planned follow-up on chronic low back pain. At her last visit I sent her to the emergency room due to a fall and altered mental status. States she was treated as a stroke and also concussion. All of her imaging came back relatively normal.  She states she just had blood work, chest x-ray, and an EKG yesterday that was ordered by her PCP. She states her blood pressure medication was also adjusted due to having an elevated blood pressure. She denies any change in her low back pain. She still hesitant about pursuing the lumbar radiofrequency ablation and would prefer a steroid injection. She is asking if I know who would do disability paperwork for her as her family doctor does not do it.   She denies any new leg paresthesias, new leg weakness, saddle anesthesia, bowel/bladder incontinence. History of Interventions:   Surgery: No previous lumbar or hip surgeries  Right shoulder tendon and rotator cuff repair - hardware in place. Injections: B/L SI joint injection (03/01/2022) - >85% relief  Cervical TPI (05/02/2022) - significant relief  R knee Synvisc injection (08/30/2022) - significant relief     Current Treatment Medications:   Tylenol - takes the edge off  Mobic 15 mg - effective  Tizanidine 4 mg - effective  THC- dulls her pain    Historical Treatment Medications:   Diclofenac - effective at that time  Tramadol - effective at that time  Alleve - takes the edge off    Imaging:  Lumbar xray (10/15/2021)  Procedure Note    Beth Padilla MD - 10/15/2021   Formatting of this note might be different from the original.   EXAMINATION:   XR LUMBAR SPINE 2-3 VIEWS (STANDARD)  10/15/2021 8:56 am     HISTORY:   ORDERING SYSTEM PROVIDED HISTORY:  Strain of lumbar region, initial encounter,      TECHNOLOGIST PROVIDED HISTORY:    Illness/Other   Reason for exam: herniated discs   Cancer History: no   Surgery, RadiationHistory: no   Encounter Type: Initial   Additional signs and symptoms: deterioration of spine chronic     ORDERING SYSTEM PROVIDED DIAGNOSIS CODES:   S39.012A Strain of lumbar region, initial encounter       COMPARISON:   X-ray lumbar spine 06/03/2020. FINDINGS:   Three views of the lumbar spine were obtained. Five lumbar-type vertebrae are present. There is mild disc space narrowing at L4-5, primarily on the right. Vertebral body heights are maintained. No acute fracture or subluxation. Multilevel facet hypertrophy is noted between L3 and S1, right greater than left. The pedicles appear intact. Visualized portions of the bony pelvis are unremarkable. Soft tissues are within normal limits. IMPRESSION:     Mild degenerative disc disease and moderate lower lumbar facet arthropathy without acute abnormality.      SLM/cdr   Past Medical History:   Diagnosis Date Osteoarthritis        Past Surgical History:   Procedure Laterality Date    BACK INJECTION Bilateral 3/1/2022    Bilateral SI joint injection performed by Jose Cole DO at [address] [facility] Bilateral     CATARACT REMOVAL Left     HIP SURGERY Right 8/30/2022    right knee injection with Synvisc. performed by Jose Cole DO at [address] [facility] Right     TUBAL LIGATION         No family history on file. Medications & Allergies:   Current Outpatient Medications   Medication Instructions    clobetasol (TEMOVATE) 0.05 % cream Topical, 2 TIMES DAILY, Apply topically 2 times daily. clobetasol (TEMOVATE) 0.05 % external solution apply to affected area once daily    losartan (COZAAR) 100 mg, Oral, DAILY    meloxicam (MOBIC) 15 MG tablet take 1 tablet by mouth every morning    PROAIR  (90 Base) MCG/ACT inhaler inhale 2 puffs by mouth every 6 hours if needed       No Known Allergies    Review of Systems:   Constitutional: negative for weight changes or fevers  Genitourinary: negative for bowel/bladder incontinence   Musculoskeletal: positive for low back pain  Neurological: negative for any leg weakness or numbness/tingling  Behavioral/Psych: negative for anxiety/depression   All other systems reviewed and are negative    Objective:     Vitals:    12/09/22 0817   BP: 126/76       Constitutional: Pleasant, no acute distress   Head: Normocephalic, atraumatic   Eyes: Conjunctivae normal   Neck: Supple, symmetrical   Lungs: Normal respiratory effort, non-labored breathing   Cardiovascular: Limbs warm and well perfused   Abdomen: Non-protruded   Musculoskeletal: Muscle bulk symmetric, no atrophy, no gross deformities   Upper extremities: Right arm: Forward flexion 90%, Abduction 90%. Positive lift-off test. Extreme tenderness with palpation of right shoulder, right trapezius, and right cervical paraspinals.    · Lower Extremities: Tenderness palpation over medial and lateral joint lines of right knee. Appreciable crepitus with knee range of motion. Ligamentous structures intact. Lumbar: Tenderness with palpation. Positive facet loading. Negative straight leg test, bilaterally. Neurological: Cranial nerves II-XII grossly intact. · Gait - Antalgic gait. Ambulates without assistive device. · Motor: No focal motor deficits appreciated lower limbs but appreciable giveaway weakness due to pain  · Sensory: LT sensation intact in lower limbs   Skin: No rashes or lesions present   Psychological: Cooperative, no exaggerated pain behaviors     Assessment:    Diagnosis Orders   1. Lumbar spondylosis  CHG FLUOR NEEDLE/CATH SPINE/PARASPINAL DX/THER ADDON    RI INJ DX/THER AGNT PARAVERT FACET JOINT, LUMBAR/SAC, 1ST LEVEL    RI INJ DX/THER AGNT PARAVERT FACET JOINT, LUMBAR/SAC, 2ND LEVEL      2. Chronic pain syndrome        3. Facet arthropathy        4. Arthralgia, unspecified joint        5. Sacroiliac joint dysfunction of both sides            Jovita Rivero is a 61 y. o.female presenting to the pain clinic for evaluation of chronic pain in several joints. Patient states her biggest pain generator is her bilateral hips. She has responded well to previous SI joint injections. She continues to have a lot of multiple joint issue pain. Her biggest pain generator at this point are her knees. I set her up for right intra-articular knee injection with Synvisc under fluoroscopic guidance. Patient's history and physical consistent with lumbar facet mediated pain. I have set her up for a lumbar facet steroid injection with Dr. Maribel Hayes targeting the bilateral L4-5 and L5-S1. We again discussed the risk of pursuing radiofrequency ablation but we will pursue the steroid injection first and potentially use this as a test injection in getting to the lumbar radiofrequency ablation if she does decide to go that route.   Advised her to check with occupational health as they may be more helpful in guiding her to who could potentially help her with her disability claim. Plan: The following treatment recommendations and plan were discussed in detail with [facility]. Imaging:   Lumbar xray reviewed and discussed with the patient. Analgesics:   Patient is taking Acetaminophen. Patient informed that the maximum amount of acetaminophen taken on a regular basis should only be 4000 mg per day. I have continued her Mobic 15mg daily. Patient is advised to take as prescribed and not take on an empty stomach. Adjuvants: For treatment of her musculoskeletal pain, the patient advised to continue tizanidine. Interventions: In presence of lumbar axial back pain and with physical exam consistent for facetal pain, the option of lumbar facet STEROID at bilateral L4/5 and L5/S1 was chosen. The risks and benefits were discussed in detail with the patient. Patient wants to proceed with the injection. Anticoagulation/NPO Recommendations:   Patient does need to hold Mobic x 4 days prior to the procedure. Patient does not need to be NPO prior to the procedure. We will do a LOCAL injection    Multidisciplinary Pain Management:   In the presence of complex, chronic, and multi-factorial pain, the importance of a multidisciplinary approach to pain management in the patients management regimen was emphasized and discussed in great detail. PHYSICAL THERAPY: Patient is advised to see a physical therapist for gentle stretching exercises and conditioning exercises for management of pain.      Referrals:  None    Prescriptions Written This Visit:   None    Follow-up: lumbar facet STEROID injection, in office 4 weeks after    MARBIN López CNP  Interventional Pain Management/PM&R   [place]

## 2022-12-30 ENCOUNTER — PREP FOR PROCEDURE (OUTPATIENT)
Dept: PHYSICAL MEDICINE AND REHAB | Age: 57
End: 2022-12-30

## 2023-01-10 ENCOUNTER — PREP FOR PROCEDURE (OUTPATIENT)
Dept: PHYSICAL MEDICINE AND REHAB | Age: 58
End: 2023-01-10

## 2023-01-10 ENCOUNTER — APPOINTMENT (OUTPATIENT)
Dept: GENERAL RADIOLOGY | Age: 58
End: 2023-01-10
Attending: ANESTHESIOLOGY
Payer: MEDICARE

## 2023-01-10 ENCOUNTER — HOSPITAL ENCOUNTER (OUTPATIENT)
Age: 58
Setting detail: OUTPATIENT SURGERY
Discharge: HOME | End: 2023-01-10
Attending: ANESTHESIOLOGY | Admitting: ANESTHESIOLOGY
Payer: MEDICARE

## 2023-01-10 VITALS
RESPIRATION RATE: 7 BRPM | WEIGHT: 224.6 LBS | OXYGEN SATURATION: 97 % | HEART RATE: 73 BPM | DIASTOLIC BLOOD PRESSURE: 73 MMHG | HEIGHT: 65 IN | BODY MASS INDEX: 37.42 KG/M2 | TEMPERATURE: 97.9 F | SYSTOLIC BLOOD PRESSURE: 155 MMHG

## 2023-01-10 PROCEDURE — 2500000003 HC RX 250 WO HCPCS: Performed by: ANESTHESIOLOGY

## 2023-01-10 PROCEDURE — 7100000010 HC PHASE II RECOVERY - FIRST 15 MIN: Performed by: ANESTHESIOLOGY

## 2023-01-10 PROCEDURE — 3600000056 HC PAIN LEVEL 4 BASE: Performed by: ANESTHESIOLOGY

## 2023-01-10 PROCEDURE — 6360000002 HC RX W HCPCS: Performed by: ANESTHESIOLOGY

## 2023-01-10 PROCEDURE — 2709999900 HC NON-CHARGEABLE SUPPLY: Performed by: ANESTHESIOLOGY

## 2023-01-10 PROCEDURE — 3209999900 FLUORO FOR SURGICAL PROCEDURES

## 2023-01-10 PROCEDURE — 64493 INJ PARAVERT F JNT L/S 1 LEV: CPT | Performed by: ANESTHESIOLOGY

## 2023-01-10 PROCEDURE — 64494 INJ PARAVERT F JNT L/S 2 LEV: CPT | Performed by: ANESTHESIOLOGY

## 2023-01-10 RX ORDER — BUPIVACAINE HYDROCHLORIDE 2.5 MG/ML
INJECTION, SOLUTION EPIDURAL; INFILTRATION; INTRACAUDAL PRN
Status: DISCONTINUED | OUTPATIENT
Start: 2023-01-10 | End: 2023-01-10 | Stop reason: ALTCHOICE

## 2023-01-10 RX ORDER — DEXAMETHASONE SODIUM PHOSPHATE 4 MG/ML
INJECTION, SOLUTION INTRA-ARTICULAR; INTRALESIONAL; INTRAMUSCULAR; INTRAVENOUS; SOFT TISSUE PRN
Status: DISCONTINUED | OUTPATIENT
Start: 2023-01-10 | End: 2023-01-10 | Stop reason: ALTCHOICE

## 2023-01-10 RX ORDER — LIDOCAINE HYDROCHLORIDE 10 MG/ML
INJECTION, SOLUTION EPIDURAL; INFILTRATION; INTRACAUDAL; PERINEURAL PRN
Status: DISCONTINUED | OUTPATIENT
Start: 2023-01-10 | End: 2023-01-10 | Stop reason: ALTCHOICE

## 2023-01-10 ASSESSMENT — PAIN DESCRIPTION - DESCRIPTORS: DESCRIPTORS: ACHING

## 2023-01-10 ASSESSMENT — PAIN - FUNCTIONAL ASSESSMENT: PAIN_FUNCTIONAL_ASSESSMENT: 0-10

## 2023-01-10 NOTE — PROGRESS NOTES
1522: Patient arrived to Phase II Recovery via cart. Awake and alert. Report received from Chela Rothmna, [facility]. Patient denies pain/numbness/tingling. Snack and drink provided. Call light in reach. 1530: Patient dressed independently in room and ambulatory to vehicle and discharged home in stable condition.

## 2023-01-10 NOTE — H&P
Today, patient presents for planned  lumbar facet STEROID at bilateral L4/5 and L5/S1    This note is reflective of the patient's previous visit for evaluation. We will proceed with today's planned procedure. Since patient's last visit for evaluation, there have been no interval changes in medical history. Patient has no new numbness, weakness, or focal neurological deficit since evaluation. Patient has no contraindications to injection (no anticoagulation or recent antibiotic intake for active infections), and has a  present or is able to drive themselves (as discussed and cleared by physician). Allergies to latex, contrast dye, and steroid medications have been confirmed with the patient prior to the procedure. NPO necessity has been assessed and accepted based on procedure complexity. The risks and benefits of the procedure have been explained including but are not limited to infection, bleeding, paralysis, immediate post procedure weakness, and dizziness; the patient acknowledges understanding and desires to proceed with the procedure. Patient has signed consent for same procedure as discussed in previous clinic encounter. All other questions and concerns were addressed at bedside. See procedure note for full details. Post procedure Instructions: The patient was advised not to drive during the day of the procedure and not to engage in any significant decision making (unless otherwise states by physician). The patient was also advised to be cautious with walking/activity for 24 hours following today's visit and asked not to engage in over-exertion (unless otherwise states by physician). After this time, it is ok to resume pre-procedure level of activity. Patient advised to apply ice to site of injection in situations of pain and discomfort. Patient advised to not submerge site of injection during bath or pool activities for approximately 24 hours post-procedure.  Patient attested to understanding post procedure directions / restrictions. All other questions and concerns addressed before patient discharge in ambulatory fashion. Chronic Pain/PM&R Clinic Note     Encounter Date: 8/4/22    Subjective:   Chief Complaint:   No chief complaint on file. History of Present Illness:   Iris Diallo is a 61 y.o. female seen in the clinic initially on 02/09/2022 upon request from [address], [place], DO for her history of bilateral knee, right shoulder and left elbow pain. Patient states her biggest pain generator at this time is her bilateral hips. Patient states she has had hip pain for over 10 years. She initially started seeing Dr. Lane Martinez at [facility] in 2012 or 2013 for this issue. Patient states at that time she did have an MRI of her bilateral hips but she does not recall the results. She was never told that she had any significant findings. Patient states she currently uses a back brace to assist with her low back pain. She will only wear this when she is out and about or driving. Patient states last time she did physical therapy for her hips was around 2016. She states this was not beneficial.  Patient denies any recent imaging of her hips. Patient states she does have a history of degenerative disc disease. Patient states pain is aggravated with activity, standing, sitting. She states her right hip is significantly worse than her left. She cannot lay on her right side. Patient states she is not sleeping well due to pain. Patient states she takes Tylenol and Aleve which do help take the edge off. Patient states when she is in significant pain she has to sit down and rest.  He also provides minimal relief, while ice is ineffective. Patient denies use of an assistive device. She denies history of falls. Patient does state that she noticed that she limps when she walks, especially after increased activity. Patient states she is currently living at home with her boyfriend.   Patient states she is not currently working as she is trying to get disability. Patient used to work in the [employer] center at [facility] where she did a lot of heavy lifting. 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 Patient denies associated leg weakness, saddle anesthesia, leg paresthesias, or bowel or bladder incontinence. Of note, patient has several pain generators such as bilateral knee pain, right shoulder pain, left elbow pain, low back pain. Patient denies history of fibromyalgia, any inflammatory conditions. But she does states she has diffuse joint pain. Patient brought in a list of prior diagnoses from Dr. Radha Torres. The list includes lumbar DDD, lumbar spondylosis, impingement syndrome of bilateral shoulders, osteoarthritis of left and right shoulder, lumbar spinal stenosis. She is also taking Mika Potter from a dermatologist for history of psoriasis. Patient states she had surgery on her right shoulder in July 2018 where they placed 6 screws, this is due to her history with osteoarthritis. The surgery was done at [facility]. Today, 12/9/2022, patient presents for planned follow-up on chronic low back pain. At her last visit I sent her to the emergency room due to a fall and altered mental status. States she was treated as a stroke and also concussion. All of her imaging came back relatively normal.  She states she just had blood work, chest x-ray, and an EKG yesterday that was ordered by her PCP. She states her blood pressure medication was also adjusted due to having an elevated blood pressure. She denies any change in her low back pain. She still hesitant about pursuing the lumbar radiofrequency ablation and would prefer a steroid injection. She is asking if I know who would do disability paperwork for her as her family doctor does not do it.   She denies any new leg paresthesias, new leg weakness, saddle anesthesia, bowel/bladder incontinence. History of Interventions:   Surgery: No previous lumbar or hip surgeries  Right shoulder tendon and rotator cuff repair - hardware in place. Injections: B/L SI joint injection (03/01/2022) - >85% relief  Cervical TPI (05/02/2022) - significant relief  R knee Synvisc injection (08/30/2022) - significant relief     Current Treatment Medications:   Tylenol - takes the edge off  Mobic 15 mg - effective  Tizanidine 4 mg - effective  THC- dulls her pain    Historical Treatment Medications:   Diclofenac - effective at that time  Tramadol - effective at that time  Alleve - takes the edge off    Imaging:  Lumbar xray (10/15/2021)  Procedure Note    Beth Padilla MD - 10/15/2021   Formatting of this note might be different from the original.   EXAMINATION:   XR LUMBAR SPINE 2-3 VIEWS (STANDARD)  10/15/2021 8:56 am     HISTORY:   ORDERING SYSTEM PROVIDED HISTORY:  Strain of lumbar region, initial encounter,      TECHNOLOGIST PROVIDED HISTORY:    Illness/Other   Reason for exam: herniated discs   Cancer History: no   Surgery, RadiationHistory: no   Encounter Type: Initial   Additional signs and symptoms: deterioration of spine chronic     ORDERING SYSTEM PROVIDED DIAGNOSIS CODES:   S39.012A Strain of lumbar region, initial encounter       COMPARISON:   X-ray lumbar spine 06/03/2020. FINDINGS:   Three views of the lumbar spine were obtained. Five lumbar-type vertebrae are present. There is mild disc space narrowing at L4-5, primarily on the right. Vertebral body heights are maintained. No acute fracture or subluxation. Multilevel facet hypertrophy is noted between L3 and S1, right greater than left. The pedicles appear intact. Visualized portions of the bony pelvis are unremarkable. Soft tissues are within normal limits. IMPRESSION:     Mild degenerative disc disease and moderate lower lumbar facet arthropathy without acute abnormality.      SLM/cdr   Past Medical History: Diagnosis Date    Osteoarthritis        Past Surgical History:   Procedure Laterality Date    BACK INJECTION Bilateral 3/1/2022    Bilateral SI joint injection performed by Camilla Mendez DO at [facility] Bilateral     CATARACT REMOVAL Left     HIP SURGERY Right 8/30/2022    right knee injection with Synvisc. performed by Camilla Mendez DO at [facility] Right     TUBAL LIGATION         No family history on file. Medications & Allergies:   Current Outpatient Medications   Medication Instructions    clobetasol (TEMOVATE) 0.05 % cream Topical, 2 TIMES DAILY, Apply topically 2 times daily. clobetasol (TEMOVATE) 0.05 % external solution apply to affected area once daily    losartan (COZAAR) 100 mg, Oral, DAILY    meloxicam (MOBIC) 15 MG tablet take 1 tablet by mouth every morning    PROAIR  (90 Base) MCG/ACT inhaler inhale 2 puffs by mouth every 6 hours if needed       No Known Allergies    Review of Systems:   Constitutional: negative for weight changes or fevers  Genitourinary: negative for bowel/bladder incontinence   Musculoskeletal: positive for low back pain  Neurological: negative for any leg weakness or numbness/tingling  Behavioral/Psych: negative for anxiety/depression   All other systems reviewed and are negative    Objective: There were no vitals filed for this visit. Constitutional: Pleasant, no acute distress   Head: Normocephalic, atraumatic   Eyes: Conjunctivae normal   Neck: Supple, symmetrical   Lungs: Normal respiratory effort, non-labored breathing   Cardiovascular: Limbs warm and well perfused   Abdomen: Non-protruded   Musculoskeletal: Muscle bulk symmetric, no atrophy, no gross deformities   Upper extremities: Right arm: Forward flexion 90%, Abduction 90%. Positive lift-off test. Extreme tenderness with palpation of right shoulder, right trapezius, and right cervical paraspinals.    · Lower Extremities: Tenderness palpation over medial and lateral joint lines of right knee. Appreciable crepitus with knee range of motion. Ligamentous structures intact. Lumbar: Tenderness with palpation. Positive facet loading. Negative straight leg test, bilaterally. Neurological: Cranial nerves II-XII grossly intact. · Gait - Antalgic gait. Ambulates without assistive device. · Motor: No focal motor deficits appreciated lower limbs but appreciable giveaway weakness due to pain  · Sensory: LT sensation intact in lower limbs   Skin: No rashes or lesions present   Psychological: Cooperative, no exaggerated pain behaviors     Assessment:    Diagnosis Orders   1. Lumbar spondylosis  CHG FLUOR NEEDLE/CATH SPINE/PARASPINAL DX/THER ADDON    MN INJ DX/THER AGNT PARAVERT FACET JOINT, LUMBAR/SAC, 1ST LEVEL    MN INJ DX/THER AGNT PARAVERT FACET JOINT, LUMBAR/SAC, 2ND LEVEL      2. Chronic pain syndrome        3. Facet arthropathy        4. Arthralgia, unspecified joint        5. Sacroiliac joint dysfunction of both sides            Iris Diallo is a 61 y. o.female presenting to the pain clinic for evaluation of chronic pain in several joints. Patient states her biggest pain generator is her bilateral hips. She has responded well to previous SI joint injections. She continues to have a lot of multiple joint issue pain. Her biggest pain generator at this point are her knees. I set her up for right intra-articular knee injection with Synvisc under fluoroscopic guidance. Patient's history and physical consistent with lumbar facet mediated pain. I have set her up for a lumbar facet steroid injection with Dr. Rogelio Peterson targeting the bilateral L4-5 and L5-S1. We again discussed the risk of pursuing radiofrequency ablation but we will pursue the steroid injection first and potentially use this as a test injection in getting to the lumbar radiofrequency ablation if she does decide to go that route.   Advised her to check with occupational health as they may be more helpful in guiding her to who could potentially help her with her disability claim. Plan: The following treatment recommendations and plan were discussed in detail with [facility]. Imaging:   Lumbar xray reviewed and discussed with the patient. Analgesics:   Patient is taking Acetaminophen. Patient informed that the maximum amount of acetaminophen taken on a regular basis should only be 4000 mg per day. I have continued her Mobic 15mg daily. Patient is advised to take as prescribed and not take on an empty stomach. Adjuvants: For treatment of her musculoskeletal pain, the patient advised to continue tizanidine. Interventions: In presence of lumbar axial back pain and with physical exam consistent for facetal pain, the option of lumbar facet STEROID at bilateral L4/5 and L5/S1 was chosen. The risks and benefits were discussed in detail with the patient. Patient wants to proceed with the injection. Anticoagulation/NPO Recommendations:   Patient does need to hold Mobic x 4 days prior to the procedure. Patient does not need to be NPO prior to the procedure. We will do a LOCAL injection    Multidisciplinary Pain Management:   In the presence of complex, chronic, and multi-factorial pain, the importance of a multidisciplinary approach to pain management in the patients management regimen was emphasized and discussed in great detail. PHYSICAL THERAPY: Patient is advised to see a physical therapist for gentle stretching exercises and conditioning exercises for management of pain.      Referrals:  None    Prescriptions Written This Visit:   None    Follow-up: lumbar facet STEROID injection, in office 4 weeks after    Sergey Presley, DO  Interventional Pain Management/PM&R   [place]

## 2023-01-10 NOTE — PROCEDURES
Pre-operative Diagnosis: Lumbar facet pain     Post-operative Diagnosis: Lumbar facet pain     Procedure: Bilateral L4/L5 and L5/S1 lumbar facet steroid injections     Procedure Description:  After having obtained a signed informed consent, the patient was taken to the fluoroscopy suite where he was placed in the prone position. The patient's back was prepped with chloraprep, and was draped in a sterile fashion. A total of 1 cc of  1 % lidocaine was used to anesthetize the skin and underlying tissues at the desired levels. Under fluoroscopic guidance in an oblique view, 22-gauge 3-1/2 inch spinal needle(s) were advanced under fluoroscopic guidance in a coaxial view to lie within the facet joint(s) at the L4/L5 and L5/S1 level on the RIGHT. There were no paresthesias, heme, or CSF aspiration. Needle position was confirmed using an oblique view. After negative aspiration, 2.5 mg of Dexamethasone plus 0.75 cc of 0.25 % bupivacaine was injected at each level. The needle(s) were removed without any complication. The same procedure was repeated on the contralateral side. The patient tolerated the procedure well and was transported to the recovery room and observed for 15 minutes prior to being discharged in ambulatory fashion.     Procedural Complications: None  Estimated Blood Loss: 0 mL          Sergey Barcenas DO  Interventional Pain Management/PM&R   [facility] and [facility]

## 2023-01-24 RX ORDER — MELOXICAM 15 MG/1
TABLET ORAL
Qty: 30 TABLET | Refills: 2 | OUTPATIENT
Start: 2023-01-24 | End: 2023-04-24

## 2023-01-24 NOTE — TELEPHONE ENCOUNTER
OARRS reviewed. UDS: negative   Last seen: 8/4/2022.  Follow-up:   Future Appointments   Date Time Provider Lorenzo Holloway   1/31/2023  8:30 AM MARBIN Menjivar - CNP [facility] [address]

## 2023-01-31 ENCOUNTER — OFFICE VISIT (OUTPATIENT)
Dept: PHYSICAL MEDICINE AND REHAB | Age: 58
End: 2023-01-31
Payer: MEDICARE

## 2023-01-31 VITALS
DIASTOLIC BLOOD PRESSURE: 82 MMHG | BODY MASS INDEX: 37.32 KG/M2 | WEIGHT: 224 LBS | SYSTOLIC BLOOD PRESSURE: 106 MMHG | HEIGHT: 65 IN

## 2023-01-31 DIAGNOSIS — M25.50 ARTHRALGIA, UNSPECIFIED JOINT: ICD-10-CM

## 2023-01-31 DIAGNOSIS — M79.18 MYOFASCIAL PAIN: ICD-10-CM

## 2023-01-31 DIAGNOSIS — M53.3 SACROILIAC JOINT DYSFUNCTION OF BOTH SIDES: ICD-10-CM

## 2023-01-31 DIAGNOSIS — M47.819 FACET ARTHROPATHY: ICD-10-CM

## 2023-01-31 DIAGNOSIS — G89.4 CHRONIC PAIN SYNDROME: ICD-10-CM

## 2023-01-31 DIAGNOSIS — M47.816 LUMBAR SPONDYLOSIS: Primary | ICD-10-CM

## 2023-01-31 DIAGNOSIS — M70.61 GREATER TROCHANTERIC BURSITIS OF RIGHT HIP: ICD-10-CM

## 2023-01-31 PROCEDURE — 4004F PT TOBACCO SCREEN RCVD TLK: CPT | Performed by: NURSE PRACTITIONER

## 2023-01-31 PROCEDURE — G8427 DOCREV CUR MEDS BY ELIG CLIN: HCPCS | Performed by: NURSE PRACTITIONER

## 2023-01-31 PROCEDURE — 3017F COLORECTAL CA SCREEN DOC REV: CPT | Performed by: NURSE PRACTITIONER

## 2023-01-31 PROCEDURE — 99214 OFFICE O/P EST MOD 30 MIN: CPT | Performed by: NURSE PRACTITIONER

## 2023-01-31 PROCEDURE — G8417 CALC BMI ABV UP PARAM F/U: HCPCS | Performed by: NURSE PRACTITIONER

## 2023-01-31 PROCEDURE — G8484 FLU IMMUNIZE NO ADMIN: HCPCS | Performed by: NURSE PRACTITIONER

## 2023-01-31 RX ORDER — ATORVASTATIN CALCIUM 40 MG/1
40 TABLET, FILM COATED ORAL DAILY
COMMUNITY

## 2023-01-31 RX ORDER — KETOCONAZOLE 20 MG/G
CREAM TOPICAL DAILY
COMMUNITY

## 2023-01-31 RX ORDER — MELOXICAM 15 MG/1
TABLET ORAL
Qty: 30 TABLET | Refills: 2 | Status: SHIPPED | OUTPATIENT
Start: 2023-01-31

## 2023-01-31 NOTE — PROGRESS NOTES
Chronic Pain/PM&R Clinic Note     Encounter Date: 8/4/22    Subjective:   Chief Complaint:   Chief Complaint   Patient presents with    Follow Up After Procedure     Lumbar 4/5, 5/Sacral 1 facet steroid injection bilateral  1/10/23    Medication Refill     Meloxicam        History of Present Illness:   Taurus Page is a 61 y.o. female seen in the clinic initially on 02/09/2022 upon request from [address], [place], DO for her history of bilateral knee, right shoulder and left elbow pain. Patient states her biggest pain generator at this time is her bilateral hips. Patient states she has had hip pain for over 10 years. She initially started seeing Dr. Ana Mejia at [facility] in 2012 or 2013 for this issue. Patient states at that time she did have an MRI of her bilateral hips but she does not recall the results. She was never told that she had any significant findings. Patient states she currently uses a back brace to assist with her low back pain. She will only wear this when she is out and about or driving. Patient states last time she did physical therapy for her hips was around 2016. She states this was not beneficial.  Patient denies any recent imaging of her hips. Patient states she does have a history of degenerative disc disease. Patient states pain is aggravated with activity, standing, sitting. She states her right hip is significantly worse than her left. She cannot lay on her right side. Patient states she is not sleeping well due to pain. Patient states she takes Tylenol and Aleve which do help take the edge off. Patient states when she is in significant pain she has to sit down and rest.  He also provides minimal relief, while ice is ineffective. Patient denies use of an assistive device. She denies history of falls. Patient does state that she noticed that she limps when she walks, especially after increased activity. Patient states she is currently living at home with her boyfriend.   Patient states she is not currently working as she is trying to get disability. Patient used to work in the [employer] center at [facility] where she did a lot of heavy lifting. 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 Patient denies associated leg weakness, saddle anesthesia, leg paresthesias, or bowel or bladder incontinence. Of note, patient has several pain generators such as bilateral knee pain, right shoulder pain, left elbow pain, low back pain. Patient denies history of fibromyalgia, any inflammatory conditions. But she does states she has diffuse joint pain. Patient brought in a list of prior diagnoses from Dr. Betty Garcia. The list includes lumbar DDD, lumbar spondylosis, impingement syndrome of bilateral shoulders, osteoarthritis of left and right shoulder, lumbar spinal stenosis. She is also taking Cady Castellon from a dermatologist for history of psoriasis. Patient states she had surgery on her right shoulder in July 2018 where they placed 6 screws, this is due to her history with osteoarthritis. The surgery was done at [facility]. Today, 01/31/2023, patient presents for planned follow up on chronic pain. She underwent a lumbar facet steroid injection on 01/10/2023 and has notice significant improvement in her low back pain. She states the pain in her low back is now a dull ache. She states she has had some occasional jolts of sharp pain to her right lateral hip. She states this has happened 3-4 times in the last 2 weeks, usually while she is sitting. She does not notice any difficulty laying on her right side or with walking. She states her joints have also been feeling more stiff recently. She denies any significant pain but notices if she sitts or lays for an extended period of time she gets very stiff. She does have home exercises that she continues to do daily to assist with feeling stiff.  She denies any other new symptoms such as focal leg weakness, leg paraesthesias, saddle anesthesia, or bowel/bladder incontinence. History of Interventions:   Surgery: No previous lumbar or hip surgeries  Right shoulder tendon and rotator cuff repair - hardware in place. Injections: B/L SI joint injection (03/01/2022) - >85% relief  Cervical TPI (05/02/2022) - significant relief  R knee Synvisc injection (08/30/2022) - significant relief   Lumbar facet steroid injection L4/5 and L5/S1 (01/10/2023) - significant relief    Current Treatment Medications:   Tylenol - takes the edge off  Mobic 15 mg - effective  Tizanidine 4 mg - effective  THC- dulls her pain    Historical Treatment Medications:   Diclofenac - effective at that time  Tramadol - effective at that time  Alleve - takes the edge off    Imaging:  Lumbar xray (10/15/2021)  Procedure Note    Zack Hickey MD - 10/15/2021   Formatting of this note might be different from the original.   EXAMINATION:   XR LUMBAR SPINE 2-3 VIEWS (STANDARD)  10/15/2021 8:56 am     HISTORY:   ORDERING SYSTEM PROVIDED HISTORY:  Strain of lumbar region, initial encounter,      TECHNOLOGIST PROVIDED HISTORY:    Illness/Other   Reason for exam: herniated discs   Cancer History: no   Surgery, RadiationHistory: no   Encounter Type: Initial   Additional signs and symptoms: deterioration of spine chronic     ORDERING SYSTEM PROVIDED DIAGNOSIS CODES:   S39.012A Strain of lumbar region, initial encounter       COMPARISON:   X-ray lumbar spine 06/03/2020. FINDINGS:   Three views of the lumbar spine were obtained. Five lumbar-type vertebrae are present. There is mild disc space narrowing at L4-5, primarily on the right. Vertebral body heights are maintained. No acute fracture or subluxation. Multilevel facet hypertrophy is noted between L3 and S1, right greater than left. The pedicles appear intact. Visualized portions of the bony pelvis are unremarkable. Soft tissues are within normal limits.     IMPRESSION:     Mild degenerative disc disease and moderate lower lumbar facet arthropathy without acute abnormality.     SLM/cdr   Past Medical History:   Diagnosis Date    Osteoarthritis        Past Surgical History:   Procedure Laterality Date    BACK INJECTION Bilateral 3/1/2022    Bilateral SI joint injection performed by Sergey Barcenas DO at [facility] OR    CARPAL TUNNEL RELEASE Bilateral     CATARACT REMOVAL Left     HIP SURGERY Right 8/30/2022    right knee injection with Synvisc. performed by Sergey Barcenas DO at [facility] OR    PAIN MANAGEMENT PROCEDURE Bilateral 1/10/2023    Lumbar 4/5, 5/Sacral 1 facet steroid injection bilateral performed by Sergey Barcenas DO at [facility] OR    ROTATOR CUFF REPAIR Right     TUBAL LIGATION         No family history on file.      Medications & Allergies:   Current Outpatient Medications   Medication Instructions    atorvastatin (LIPITOR) 40 mg, Oral, DAILY    clobetasol (TEMOVATE) 0.05 % cream Topical, 2 TIMES DAILY, Apply topically 2 times daily.    clobetasol (TEMOVATE) 0.05 % external solution apply to affected area once daily    ketoconazole (NIZORAL) 2 % cream Topical, DAILY, Apply topically daily.    losartan (COZAAR) 100 mg, Oral, DAILY    meloxicam (MOBIC) 15 MG tablet take 1 tablet by mouth every morning    PROAIR  (90 Base) MCG/ACT inhaler inhale 2 puffs by mouth every 6 hours if needed       No Known Allergies    Review of Systems:   Constitutional: negative for weight changes or fevers  Genitourinary: negative for bowel/bladder incontinence   Musculoskeletal: positive for low back pain  Neurological: negative for any leg weakness or numbness/tingling  Behavioral/Psych: negative for anxiety/depression   All other systems reviewed and are negative    Objective:     Vitals:    01/31/23 0829   BP: 106/82       Constitutional: Pleasant, no acute distress   Head: Normocephalic, atraumatic   Eyes: Conjunctivae normal  Neck: Supple, symmetrical   Lungs: Normal respiratory effort, non-labored breathing   Cardiovascular: Limbs warm and well perfused   Abdomen: Non-protruded   Musculoskeletal: Muscle bulk symmetric, no atrophy, no gross deformities   Upper extremities: Right arm: Forward flexion 90%, Abduction 90%. Positive lift-off test. Extreme tenderness with palpation of right shoulder, right trapezius, and right cervical paraspinals. · Lower Extremities: Tenderness palpation over medial and lateral joint lines of right knee. Appreciable crepitus with knee range of motion. Ligamentous structures intact. Lumbar: Tenderness with palpation. Positive facet loading. Negative straight leg test, bilaterally. Mild tenderness tpo right greater trochanter bursa. Neurological: Cranial nerves II-XII grossly intact. · Gait - Antalgic gait. Ambulates without assistive device. · Motor: No focal motor deficits appreciated lower limbs but appreciable giveaway weakness due to pain  · Sensory: LT sensation intact in lower limbs   Skin: No rashes or lesions present   Psychological: Cooperative, no exaggerated pain behaviors     Assessment:    Diagnosis Orders   1. Lumbar spondylosis        2. Chronic pain syndrome        3. Facet arthropathy        4. Arthralgia, unspecified joint        5. Sacroiliac joint dysfunction of both sides        6. Myofascial pain        7. Greater trochanteric bursitis of right hip            Brijesh Garza is a 61 y. o.female presenting to the pain clinic for evaluation of chronic pain in several joints. She has responded well to previous SI joint injections and lumbar facet steroid injections. She continues to have a lot of multiple joint issue pain. Her biggest pain generator at this time is her right hip pain which her history and physical align with right greater trochanter bursitis. I have recommended ice and lidocaine patches to her right hip if the pain continues.  If this continues or gets worse she may benefit from a right GTB injection. Plan: The following treatment recommendations and plan were discussed in detail with [facility]. Imaging:   Lumbar xray and bilateral hip xray reviewed and discussed with the patient. Analgesics:   Patient is taking Acetaminophen. Patient informed that the maximum amount of acetaminophen taken on a regular basis should only be 4000 mg per day. I have continued her Mobic 15mg daily. Patient is advised to take as prescribed and not take on an empty stomach. Adjuvants: For treatment of her musculoskeletal pain, the patient advised to continue tizanidine. Interventions:   None    Anticoagulation/NPO Recommendations:   None    Multidisciplinary Pain Management:   In the presence of complex, chronic, and multi-factorial pain, the importance of a multidisciplinary approach to pain management in the patients management regimen was emphasized and discussed in great detail. PHYSICAL THERAPY: Patient is advised to see a physical therapist for gentle stretching exercises and conditioning exercises for management of pain.      Referrals:  None    Prescriptions Written This Visit:   Mobic 15 mg (#30, 2 refills)    Follow-up: 8 weeks    Jeanie Patel APRN - CNP  Interventional Pain Management/PM&R   [place]

## 2023-02-27 ENCOUNTER — OFFICE VISIT (OUTPATIENT)
Dept: PHYSICAL MEDICINE AND REHAB | Age: 58
End: 2023-02-27
Payer: MEDICAID

## 2023-02-27 VITALS
SYSTOLIC BLOOD PRESSURE: 124 MMHG | BODY MASS INDEX: 37.32 KG/M2 | HEIGHT: 65 IN | DIASTOLIC BLOOD PRESSURE: 80 MMHG | WEIGHT: 224 LBS

## 2023-02-27 DIAGNOSIS — M25.561 ARTHRALGIA OF BOTH KNEES: ICD-10-CM

## 2023-02-27 DIAGNOSIS — M70.61 GREATER TROCHANTERIC BURSITIS OF RIGHT HIP: ICD-10-CM

## 2023-02-27 DIAGNOSIS — M15.9 OSTEOARTHRITIS OF MULTIPLE JOINTS, UNSPECIFIED OSTEOARTHRITIS TYPE: ICD-10-CM

## 2023-02-27 DIAGNOSIS — M47.816 LUMBAR SPONDYLOSIS: Primary | ICD-10-CM

## 2023-02-27 DIAGNOSIS — M54.50 CHRONIC BILATERAL LOW BACK PAIN, UNSPECIFIED WHETHER SCIATICA PRESENT: ICD-10-CM

## 2023-02-27 DIAGNOSIS — G89.4 CHRONIC PAIN SYNDROME: ICD-10-CM

## 2023-02-27 DIAGNOSIS — M25.562 ARTHRALGIA OF BOTH KNEES: ICD-10-CM

## 2023-02-27 DIAGNOSIS — G89.29 CHRONIC BILATERAL LOW BACK PAIN, UNSPECIFIED WHETHER SCIATICA PRESENT: ICD-10-CM

## 2023-02-27 DIAGNOSIS — M53.3 SACROILIAC JOINT DYSFUNCTION OF BOTH SIDES: ICD-10-CM

## 2023-02-27 PROCEDURE — 99214 OFFICE O/P EST MOD 30 MIN: CPT | Performed by: NURSE PRACTITIONER

## 2023-02-27 RX ORDER — TIZANIDINE 4 MG/1
4 TABLET ORAL 3 TIMES DAILY PRN
Qty: 30 TABLET | Refills: 0 | Status: SHIPPED | OUTPATIENT
Start: 2023-02-27 | End: 2023-03-09

## 2023-02-27 NOTE — PROGRESS NOTES
Chronic Pain/PM&R Clinic Note     Encounter Date: 8/4/22    Subjective:   Chief Complaint:   Chief Complaint   Patient presents with    Follow-up     Pain all over body in joints        History of Present Illness:   Augustina Ibarra is a 61 y.o. female seen in the clinic initially on 02/09/2022 upon request from [address], [place], [place] for her history of bilateral knee, right shoulder and left elbow pain. Patient states her biggest pain generator at this time is her bilateral hips. Patient states she has had hip pain for over 10 years. She initially started seeing Dr. Sulma Meneses at [facility] in 2012 or 2013 for this issue. Patient states at that time she did have an MRI of her bilateral hips but she does not recall the results. She was never told that she had any significant findings. Patient states she currently uses a back brace to assist with her low back pain. She will only wear this when she is out and about or driving. Patient states last time she did physical therapy for her hips was around 2016. She states this was not beneficial.  Patient denies any recent imaging of her hips. Patient states she does have a history of degenerative disc disease. Patient states pain is aggravated with activity, standing, sitting. She states her right hip is significantly worse than her left. She cannot lay on her right side. Patient states she is not sleeping well due to pain. Patient states she takes Tylenol and Aleve which do help take the edge off. Patient states when she is in significant pain she has to sit down and rest.  He also provides minimal relief, while ice is ineffective. Patient denies use of an assistive device. She denies history of falls. Patient does state that she noticed that she limps when she walks, especially after increased activity. Patient states she is currently living at home with her boyfriend. Patient states she is not currently working as she is trying to get disability.   Patient used to work in the [employer] at [employer] [facility] where she did a lot of heavy lifting. 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 Patient denies associated leg weakness, saddle anesthesia, leg paresthesias, or bowel or bladder incontinence. Of note, patient has several pain generators such as bilateral knee pain, right shoulder pain, left elbow pain, low back pain. Patient denies history of fibromyalgia, any inflammatory conditions. But she does states she has diffuse joint pain. Patient brought in a list of prior diagnoses from Dr. Reji Engle. The list includes lumbar DDD, lumbar spondylosis, impingement syndrome of bilateral shoulders, osteoarthritis of left and right shoulder, lumbar spinal stenosis. She is also taking Setha Juarez from a dermatologist for history of psoriasis. Patient states she had surgery on her right shoulder in July 2018 where they placed 6 screws, this is due to her history with osteoarthritis. The surgery was done at [facility]. Today, 02/27/2023, patient presents for planned follow up on chronic pain. Patient is tearful today. She states the rain has really been causing increased pain. She states \"all of her bones/joints hurt\" She is just mad that she has to deal with this chronic pain because she wants to be active but struggles due to her pain. She also does not want to take a bunch of medication. She mentioned her hips and knees being most bothersome but does feel it is widespread. Her dermatologist does not want to start her back on Venda Nakai for her psoriasis because her numbers have been good. She states she did see a rheumatologist in her 20 or 34s but nothing came of it. She believes her grandmother did have rheumatoid arthritis. The mobic does help significantly.  She does not recall take Tizanidine in the past.     History of Interventions:   Surgery: No previous lumbar or hip surgeries  Right shoulder tendon and rotator cuff repair - hardware in place. Injections: B/L SI joint injection (03/01/2022) - >85% relief  Cervical TPI (05/02/2022) - significant relief  R knee Synvisc injection (08/30/2022) - significant relief   Lumbar facet steroid injection L4/5 and L5/S1 (01/10/2023) - significant relief    Current Treatment Medications:   Tylenol - takes the edge off  Mobic 15 mg - effective  THC- dulls her pain    Historical Treatment Medications:   Diclofenac - effective at that time  Tramadol - effective at that time  Alleve - takes the edge off  Tizanidine 4 mg - effective    Imaging:  Lumbar xray (10/15/2021)  Procedure Note    Rosa Bautista MD - 10/15/2021   Formatting of this note might be different from the original.   EXAMINATION:   XR LUMBAR SPINE 2-3 VIEWS (STANDARD)  10/15/2021 8:56 am     HISTORY:   ORDERING SYSTEM PROVIDED HISTORY:  Strain of lumbar region, initial encounter,      TECHNOLOGIST PROVIDED HISTORY:    Illness/Other   Reason for exam: herniated discs   Cancer History: no   Surgery, RadiationHistory: no   Encounter Type: Initial   Additional signs and symptoms: deterioration of spine chronic     ORDERING SYSTEM PROVIDED DIAGNOSIS CODES:   S39.012A Strain of lumbar region, initial encounter       COMPARISON:   X-ray lumbar spine 06/03/2020. FINDINGS:   Three views of the lumbar spine were obtained. Five lumbar-type vertebrae are present. There is mild disc space narrowing at L4-5, primarily on the right. Vertebral body heights are maintained. No acute fracture or subluxation. Multilevel facet hypertrophy is noted between L3 and S1, right greater than left. The pedicles appear intact. Visualized portions of the bony pelvis are unremarkable. Soft tissues are within normal limits. IMPRESSION:     Mild degenerative disc disease and moderate lower lumbar facet arthropathy without acute abnormality.      SLM/cdr   Past Medical History:   Diagnosis Date    Osteoarthritis Past Surgical History:   Procedure Laterality Date    BACK INJECTION Bilateral 3/1/2022    Bilateral SI joint injection performed by Flory Hassan DO at [address] [place] [address] Bilateral     CATARACT REMOVAL Left     HIP SURGERY Right 8/30/2022    right knee injection with Synvisc. performed by Flory Hassan DO at [facility] Bilateral 1/10/2023    Lumbar 4/5, 5/Sacral 1 facet steroid injection bilateral performed by Flory Hassan DO at [address] [place] [address] Right     TUBAL LIGATION         No family history on file. Medications & Allergies:   Current Outpatient Medications   Medication Instructions    atorvastatin (LIPITOR) 40 mg, Oral, DAILY    clobetasol (TEMOVATE) 0.05 % cream Topical, 2 TIMES DAILY, Apply topically 2 times daily. clobetasol (TEMOVATE) 0.05 % external solution apply to affected area once daily    ketoconazole (NIZORAL) 2 % cream Topical, DAILY, Apply topically daily.     losartan (COZAAR) 100 mg, Oral, DAILY    meloxicam (MOBIC) 15 MG tablet take 1 tablet by mouth every morning    PROAIR  (90 Base) MCG/ACT inhaler inhale 2 puffs by mouth every 6 hours if needed    tiZANidine (ZANAFLEX) 4 mg, Oral, 3 TIMES DAILY PRN       No Known Allergies    Review of Systems:   Constitutional: negative for weight changes or fevers  Genitourinary: negative for bowel/bladder incontinence   Musculoskeletal: positive for low back pain, widespread joint pain  Neurological: negative for any leg weakness or numbness/tingling  Behavioral/Psych: negative for anxiety/depression   All other systems reviewed and are negative    Objective:     Vitals:    02/27/23 1527   BP: 124/80       Constitutional: Pleasant, no acute distress   Head: Normocephalic, atraumatic   Eyes: Conjunctivae normal   Neck: Supple, symmetrical   Lungs: Normal respiratory effort, non-labored breathing   Cardiovascular: Limbs warm and well perfused   Abdomen: Non-protruded   Musculoskeletal: Muscle bulk symmetric, no atrophy, no gross deformities   Upper extremities: Right arm: Forward flexion 90%, Abduction 90%. Positive lift-off test. Extreme tenderness with palpation of right shoulder, right trapezius, and right cervical paraspinals. · Lower Extremities: Tenderness palpation over medial and lateral joint lines of right knee. Appreciable crepitus with knee range of motion. Ligamentous structures intact. Lumbar: Tenderness with palpation. Positive facet loading. Negative straight leg test, bilaterally. Mild tenderness tpo right greater trochanter bursa. Neurological: Cranial nerves II-XII grossly intact. · Gait - Antalgic gait. Ambulates without assistive device. · Motor: No focal motor deficits appreciated lower limbs but appreciable giveaway weakness due to pain  · Sensory: LT sensation intact in lower limbs   Skin: No rashes or lesions present   Psychological: Cooperative, no exaggerated pain behaviors     Assessment:    Diagnosis Orders   1. Lumbar spondylosis        2. Arthralgia of both knees        3. Osteoarthritis of multiple joints, unspecified osteoarthritis type        4. Sacroiliac joint dysfunction of both sides        5. Greater trochanteric bursitis of right hip        6. Chronic bilateral low back pain, unspecified whether sciatica present        7. Chronic pain syndrome          Romi Navas is a 61 y. o.female presenting to the pain clinic for evaluation of chronic pain in several joints. She has responded well to previous SI joint injections and lumbar facet steroid injections. She continues to have a lot of pain in multiple joints. Her biggest pain generator at this time is her right hip pain which her history and physical align with right greater trochanter bursitis.  We discussed continuing to pursue injections which have helped in the past. I have re-started her on tizanidine which I have documented has helped in the past. I have referred her to Rheumatology to see if there may be an underlying autoimmune condition contributing to her pain. Plan: The following treatment recommendations and plan were discussed in detail with [facility]. Imaging:   Lumbar xray and bilateral hip xray reviewed and discussed with the patient. Analgesics:   Patient is taking Acetaminophen. Patient informed that the maximum amount of acetaminophen taken on a regular basis should only be 4000 mg per day. I have continued her Mobic 15mg daily. Patient is advised to take as prescribed and not take on an empty stomach. Adjuvants: For treatment of her musculoskeletal pain, the patient advised to restart tizanidine. Interventions:   None    Anticoagulation/NPO Recommendations:   None    Multidisciplinary Pain Management:   In the presence of complex, chronic, and multi-factorial pain, the importance of a multidisciplinary approach to pain management in the patients management regimen was emphasized and discussed in great detail. PHYSICAL THERAPY: Patient is advised to see a physical therapist for gentle stretching exercises and conditioning exercises for management of pain.      Referrals:  Dr. Shanell Toribio     Prescriptions Written This Visit:   Tizanidine 4 mg (#30, 0 refills)    Follow-up: 8 weeks    MARBIN Pierce - HARIKA  Interventional Pain Management/PM&R   [place]

## 2023-03-20 ENCOUNTER — HOSPITAL ENCOUNTER (OUTPATIENT)
Dept: GENERAL RADIOLOGY | Age: 58
Discharge: HOME | End: 2023-03-20
Payer: MEDICAID

## 2023-03-20 ENCOUNTER — OFFICE VISIT (OUTPATIENT)
Dept: RHEUMATOLOGY | Age: 58
End: 2023-03-20
Payer: MEDICAID

## 2023-03-20 ENCOUNTER — HOSPITAL ENCOUNTER (OUTPATIENT)
Age: 58
Discharge: HOME | End: 2023-03-20
Payer: MEDICAID

## 2023-03-20 VITALS
OXYGEN SATURATION: 97 % | BODY MASS INDEX: 37.32 KG/M2 | HEIGHT: 65 IN | WEIGHT: 224 LBS | HEART RATE: 66 BPM | DIASTOLIC BLOOD PRESSURE: 80 MMHG | SYSTOLIC BLOOD PRESSURE: 130 MMHG

## 2023-03-20 DIAGNOSIS — G89.29 CHRONIC PAIN OF BOTH KNEES: ICD-10-CM

## 2023-03-20 DIAGNOSIS — M25.561 CHRONIC PAIN OF BOTH KNEES: ICD-10-CM

## 2023-03-20 DIAGNOSIS — G89.29 CHRONIC MIDLINE LOW BACK PAIN WITHOUT SCIATICA: ICD-10-CM

## 2023-03-20 DIAGNOSIS — M46.1 SACROILIITIS (HCC): ICD-10-CM

## 2023-03-20 DIAGNOSIS — M25.562 CHRONIC PAIN OF BOTH KNEES: ICD-10-CM

## 2023-03-20 DIAGNOSIS — M54.50 CHRONIC MIDLINE LOW BACK PAIN WITHOUT SCIATICA: ICD-10-CM

## 2023-03-20 DIAGNOSIS — G89.29 CHRONIC MIDLINE LOW BACK PAIN WITHOUT SCIATICA: Primary | ICD-10-CM

## 2023-03-20 DIAGNOSIS — M54.50 CHRONIC MIDLINE LOW BACK PAIN WITHOUT SCIATICA: Primary | ICD-10-CM

## 2023-03-20 LAB
ALBUMIN SERPL BCG-MCNC: 4.4 G/DL (ref 3.5–5.1)
ALP SERPL-CCNC: 82 U/L (ref 38–126)
ALT SERPL W/O P-5'-P-CCNC: 17 U/L (ref 11–66)
ANION GAP SERPL CALC-SCNC: 4 MEQ/L (ref 8–16)
AST SERPL-CCNC: 14 U/L (ref 5–40)
BASOPHILS ABSOLUTE: 0.1 THOU/MM3 (ref 0–0.1)
BASOPHILS NFR BLD AUTO: 1 %
BILIRUB SERPL-MCNC: 0.3 MG/DL (ref 0.3–1.2)
BUN SERPL-MCNC: 16 MG/DL (ref 7–22)
CALCIUM SERPL-MCNC: 9.2 MG/DL (ref 8.5–10.5)
CHLORIDE SERPL-SCNC: 107 MEQ/L (ref 98–111)
CO2 SERPL-SCNC: 29 MEQ/L (ref 23–33)
CREAT SERPL-MCNC: 1 MG/DL (ref 0.4–1.2)
CRP SERPL-MCNC: 0.4 MG/DL (ref 0–1)
DEPRECATED RDW RBC AUTO: 46.4 FL (ref 35–45)
EOSINOPHIL NFR BLD AUTO: 2.1 %
EOSINOPHILS ABSOLUTE: 0.2 THOU/MM3 (ref 0–0.4)
ERYTHROCYTE [DISTWIDTH] IN BLOOD BY AUTOMATED COUNT: 13.7 % (ref 11.5–14.5)
ERYTHROCYTE [SEDIMENTATION RATE] IN BLOOD BY WESTERGREN METHOD: 30 MM/HR (ref 0–20)
GFR SERPL CREATININE-BSD FRML MDRD: > 60 ML/MIN/1.73M2
GLUCOSE SERPL-MCNC: 86 MG/DL (ref 70–108)
HCT VFR BLD AUTO: 40 % (ref 37–47)
HGB BLD-MCNC: 13 GM/DL (ref 12–16)
IMM GRANULOCYTES # BLD AUTO: 0.03 THOU/MM3 (ref 0–0.07)
IMM GRANULOCYTES NFR BLD AUTO: 0.4 %
LYMPHOCYTES ABSOLUTE: 2.6 THOU/MM3 (ref 1–4.8)
LYMPHOCYTES NFR BLD AUTO: 33.5 %
MCH RBC QN AUTO: 29.8 PG (ref 26–33)
MCHC RBC AUTO-ENTMCNC: 32.5 GM/DL (ref 32.2–35.5)
MCV RBC AUTO: 91.7 FL (ref 81–99)
MONOCYTES ABSOLUTE: 0.5 THOU/MM3 (ref 0.4–1.3)
MONOCYTES NFR BLD AUTO: 6.6 %
NEUTROPHILS NFR BLD AUTO: 56.4 %
NRBC BLD AUTO-RTO: 0 /100 WBC
PLATELET # BLD AUTO: 341 THOU/MM3 (ref 130–400)
PMV BLD AUTO: 10.1 FL (ref 9.4–12.4)
POTASSIUM SERPL-SCNC: 4.4 MEQ/L (ref 3.5–5.2)
PROT SERPL-MCNC: 6.8 G/DL (ref 6.1–8)
RBC # BLD AUTO: 4.36 MILL/MM3 (ref 4.2–5.4)
RHEUMATOID FACT SERPL-ACNC: < 10 IU/ML (ref 0–13)
SEGMENTED NEUTROPHILS ABSOLUTE COUNT: 4.5 THOU/MM3 (ref 1.8–7.7)
SODIUM SERPL-SCNC: 140 MEQ/L (ref 135–145)
URATE SERPL-MCNC: 3.5 MG/DL (ref 2.4–5.7)
WBC # BLD AUTO: 7.9 THOU/MM3 (ref 4.8–10.8)

## 2023-03-20 PROCEDURE — 86038 ANTINUCLEAR ANTIBODIES: CPT

## 2023-03-20 PROCEDURE — 86200 CCP ANTIBODY: CPT

## 2023-03-20 PROCEDURE — 86430 RHEUMATOID FACTOR TEST QUAL: CPT

## 2023-03-20 PROCEDURE — 85025 COMPLETE CBC W/AUTO DIFF WBC: CPT

## 2023-03-20 PROCEDURE — 73562 X-RAY EXAM OF KNEE 3: CPT

## 2023-03-20 PROCEDURE — 72202 X-RAY EXAM SI JOINTS 3/> VWS: CPT

## 2023-03-20 PROCEDURE — 84550 ASSAY OF BLOOD/URIC ACID: CPT

## 2023-03-20 PROCEDURE — 36415 COLL VENOUS BLD VENIPUNCTURE: CPT

## 2023-03-20 PROCEDURE — 99204 OFFICE O/P NEW MOD 45 MIN: CPT | Performed by: INTERNAL MEDICINE

## 2023-03-20 PROCEDURE — 85651 RBC SED RATE NONAUTOMATED: CPT

## 2023-03-20 PROCEDURE — 86140 C-REACTIVE PROTEIN: CPT

## 2023-03-20 PROCEDURE — 80053 COMPREHEN METABOLIC PANEL: CPT

## 2023-03-20 RX ORDER — TIZANIDINE 4 MG/1
4 TABLET ORAL EVERY 6 HOURS PRN
COMMUNITY

## 2023-03-20 ASSESSMENT — ENCOUNTER SYMPTOMS
COUGH: 0
BLOOD IN STOOL: 0
SHORTNESS OF BREATH: 0
VOMITING: 0
ABDOMINAL PAIN: 0
NAUSEA: 0

## 2023-03-20 NOTE — PROGRESS NOTES
meloxicam (MOBIC) 15 MG tablet, take 1 tablet by mouth every morning, Disp: 30 tablet, Rfl: 2    losartan (COZAAR) 100 MG tablet, Take 100 mg by mouth daily, Disp: , Rfl:     clobetasol (TEMOVATE) 0.05 % cream, Apply topically 2 times daily Apply topically 2 times daily. , Disp: , Rfl:     clobetasol (TEMOVATE) 0.05 % external solution, apply to affected area once daily, Disp: , Rfl:     PROAIR  (90 Base) MCG/ACT inhaler, inhale 2 puffs by mouth every 6 hours if needed, Disp: , Rfl:     Allergies:    Patient has no known allergies. Social History:     reports that she has been smoking cigarettes. She has been smoking an average of .5 packs per day. She has never used smokeless tobacco. She reports current alcohol use. She reports current drug use. Frequency: 14.00 times per week. Drug: Marijuana Lenin Ion). Family History:   family history includes Other in her mother. REVIEW OF SYSTEMS:    Review of Systems   Constitutional:  Positive for fatigue. Negative for chills and fever. HENT:  Negative for mouth sores. Respiratory:  Negative for cough and shortness of breath. Cardiovascular:  Negative for chest pain and leg swelling. Gastrointestinal:  Negative for abdominal pain, blood in stool, nausea and vomiting. Genitourinary:  Negative for hematuria. Skin:  Negative for rash. PHYSICAL EXAM:    Vitals:    /80 (Site: Left Upper Arm, Position: Sitting, Cuff Size: Large Adult)   Pulse 66   Ht 5' 5\" (1.651 m)   Wt 224 lb (101.6 kg)   SpO2 97%   BMI 37.28 kg/m²     Physical Exam  Constitutional:       General: She is not in acute distress. Appearance: Normal appearance. HENT:      Mouth/Throat:      Mouth: Mucous membranes are moist.      Pharynx: Oropharynx is clear. Eyes:      Conjunctiva/sclera: Conjunctivae normal.   Cardiovascular:      Rate and Rhythm: Normal rate and regular rhythm. Heart sounds: Normal heart sounds. No murmur heard. No friction rub.

## 2023-03-22 LAB
CYCLIC CITRULLINATED PEPTIDE ANTIBODY IGG: 1.4 U/ML (ref 0–7)
NUCLEAR IGG SER QL IA: NORMAL

## 2023-03-29 RX ORDER — TIZANIDINE 4 MG/1
4 TABLET ORAL EVERY 6 HOURS PRN
OUTPATIENT
Start: 2023-03-29

## 2023-03-29 NOTE — TELEPHONE ENCOUNTER
[employer] called requesting a refill on the following medications:  Requested Prescriptions     Pending Prescriptions Disp Refills    tiZANidine (ZANAFLEX) 4 MG tablet       Sig: Take 1 tablet by mouth every 6 hours as needed     [facility] verified[facility]   . pv      Date of last visit:   Date of next visit (if applicable): 0/6/3618

## 2023-04-03 ENCOUNTER — OFFICE VISIT (OUTPATIENT)
Dept: RHEUMATOLOGY | Age: 58
End: 2023-04-03
Payer: MEDICAID

## 2023-04-03 VITALS
BODY MASS INDEX: 37.32 KG/M2 | SYSTOLIC BLOOD PRESSURE: 136 MMHG | HEART RATE: 62 BPM | OXYGEN SATURATION: 96 % | WEIGHT: 224 LBS | HEIGHT: 65 IN | DIASTOLIC BLOOD PRESSURE: 80 MMHG

## 2023-04-03 DIAGNOSIS — M54.50 CHRONIC MIDLINE LOW BACK PAIN WITHOUT SCIATICA: ICD-10-CM

## 2023-04-03 DIAGNOSIS — Z79.899 ENCOUNTER FOR LONG-TERM (CURRENT) USE OF HIGH-RISK MEDICATION: ICD-10-CM

## 2023-04-03 DIAGNOSIS — G89.29 CHRONIC MIDLINE LOW BACK PAIN WITHOUT SCIATICA: ICD-10-CM

## 2023-04-03 DIAGNOSIS — L40.50 PSORIATIC ARTHRITIS (HCC): Primary | ICD-10-CM

## 2023-04-03 PROCEDURE — 99214 OFFICE O/P EST MOD 30 MIN: CPT | Performed by: INTERNAL MEDICINE

## 2023-04-03 RX ORDER — LEFLUNOMIDE 20 MG/1
20 TABLET ORAL DAILY
Qty: 30 TABLET | Refills: 1 | Status: SHIPPED | OUTPATIENT
Start: 2023-04-03 | End: 2023-05-03

## 2023-04-03 ASSESSMENT — ENCOUNTER SYMPTOMS
VOMITING: 0
SHORTNESS OF BREATH: 0
ABDOMINAL PAIN: 0
NAUSEA: 0
COUGH: 0

## 2023-04-03 NOTE — PROGRESS NOTES
Musculoskeletal:         General: Tenderness (tenderness in SI joints.) present. No swelling or deformity. Normal range of motion. Cervical back: Neck supple. Skin:     General: Skin is warm and dry. Findings: No rash. Neurological:      General: No focal deficit present. Mental Status: She is alert and oriented to person, place, and time.       Gait: Gait normal.   Psychiatric:         Mood and Affect: Mood normal.          DATA:     Latest Reference Range & Units 03/20/23 12:10   Sodium 135 - 145 meq/L 140   Potassium 3.5 - 5.2 meq/L 4.4   Chloride 98 - 111 meq/L 107   CO2 23 - 33 meq/L 29   BUN,BUNPL 7 - 22 mg/dL 16   Creatinine 0.4 - 1.2 mg/dL 1.0   Anion Gap 8.0 - 16.0 meq/L 4.0 (L)   Est, Glom Filt Rate >60 ml/min/1.73m2 >60   Glucose, Random 70 - 108 mg/dL 86   CALCIUM, SERUM, 328819 8.5 - 10.5 mg/dL 9.2   Total Protein 6.1 - 8.0 g/dL 6.8   URIC ACID,URA 2.4 - 5.7 mg/dL 3.5      Latest Reference Range & Units 03/20/23 12:10   CRP 0.00 - 1.00 mg/dl 0.40      Latest Reference Range & Units 03/20/23 12:10   Albumin 3.5 - 5.1 g/dL 4.4   Alk Phos 38 - 126 U/L 82   ALT 11 - 66 U/L 17   AST 5 - 40 U/L 14   BILIRUBIN TOTAL 0.3 - 1.2 mg/dL 0.3   Total Protein 6.1 - 8.0 g/dL 6.8      Latest Reference Range & Units 03/20/23 12:10   WBC 4.8 - 10.8 thou/mm3 7.9   RBC 4.20 - 5.40 mill/mm3 4.36   Hemoglobin Quant 12.0 - 16.0 gm/dl 13.0   Hematocrit 37.0 - 47.0 % 40.0   MCV 81.0 - 99.0 fL 91.7   MCH 26.0 - 33.0 pg 29.8   MCHC 32.2 - 35.5 gm/dl 32.5   MPV 9.4 - 12.4 fL 10.1   RDW-CV 11.5 - 14.5 % 13.7   RDW-SD 35.0 - 45.0 fL 46.4 (H)   Platelet Count 444 - 400 thou/mm3 341      [facility] Reference Range & Units 03/20/23 12:10   WBC 4.8 - 10.8 thou/mm3 7.9   RBC 4.20 - 5.40 mill/mm3 4.36   Hemoglobin Quant 12.0 - 16.0 gm/dl 13.0   Hematocrit 37.0 - 47.0 % 40.0   MCV 81.0 - 99.0 fL 91.7   MCH 26.0 - 33.0 pg 29.8   MCHC 32.2 - 35.5 gm/dl 32.5   MPV 9.4 - 12.4 fL 10.1   RDW-CV 11.5 - 14.5 % 13.7   RDW-SD 35.0 -

## 2023-04-05 ENCOUNTER — HOSPITAL ENCOUNTER (OUTPATIENT)
Dept: GENERAL RADIOLOGY | Age: 58
Discharge: HOME | End: 2023-04-05
Payer: MEDICAID

## 2023-04-05 ENCOUNTER — HOSPITAL ENCOUNTER (OUTPATIENT)
Age: 58
Discharge: HOME | End: 2023-04-05
Payer: MEDICAID

## 2023-04-05 DIAGNOSIS — M54.50 CHRONIC MIDLINE LOW BACK PAIN WITHOUT SCIATICA: ICD-10-CM

## 2023-04-05 DIAGNOSIS — G89.29 CHRONIC MIDLINE LOW BACK PAIN WITHOUT SCIATICA: ICD-10-CM

## 2023-04-05 PROCEDURE — 72100 X-RAY EXAM L-S SPINE 2/3 VWS: CPT

## 2023-04-18 ENCOUNTER — TELEPHONE (OUTPATIENT)
Dept: RHEUMATOLOGY | Age: 58
End: 2023-04-18

## 2023-04-18 NOTE — TELEPHONE ENCOUNTER
Patient was last seen 4/3/2023 and her next appt is 5/16/2023. She is calling in because she was seen at [facility] [address] in [place] today and diagnosed with shingles. They told her to stop the leflunomide until she checked with Dr Pop Winn to see what to do. The ER gave her valacyclovir 1 gram to take one tablet 3 times per day. Please call her to advise.

## 2023-04-18 NOTE — TELEPHONE ENCOUNTER
Spoke with ED physician at [facility]. Pt presented to ED with suspected zoster on the left chest wall extending to left scapula. She was instructed to hold leflunomide, which she started a week ago. She was prescribed anti-viral treatment. Please instruct pt to hold leflunomide for 10-14 days, until the lesions have crusted and dried up, then she may restart it afterwards.

## 2023-04-20 ENCOUNTER — TELEPHONE (OUTPATIENT)
Dept: PHYSICAL MEDICINE AND REHAB | Age: 58
End: 2023-04-20

## 2023-04-20 NOTE — TELEPHONE ENCOUNTER
Pt stated rash is only located in the shoulder region. Informed that it is okay to proceed as long as the rash is not in the low back/ buttocks region.   Patient verbalized understanding

## 2023-04-20 NOTE — TELEPHONE ENCOUNTER
Pt stated she was diagnosed with shingles 4/18. Patient started valacyclovir as prescribed.   SI joint injection scheduled 5/9, Does she need to reschedule, please advise

## 2023-04-20 NOTE — TELEPHONE ENCOUNTER
Ok to proceed with the injection as long as the shingles are not in the location of the injection (low back/buttocks).

## 2023-04-28 ENCOUNTER — HOSPITAL ENCOUNTER (OUTPATIENT)
Dept: MRI IMAGING | Age: 58
Discharge: HOME | End: 2023-04-28
Payer: MEDICAID

## 2023-04-28 DIAGNOSIS — R29.898 WEAKNESS OF BOTH LOWER EXTREMITIES: ICD-10-CM

## 2023-04-28 DIAGNOSIS — M47.816 LUMBAR SPONDYLOSIS: ICD-10-CM

## 2023-04-28 PROCEDURE — 72148 MRI LUMBAR SPINE W/O DYE: CPT

## 2023-05-05 ENCOUNTER — PREP FOR PROCEDURE (OUTPATIENT)
Dept: PHYSICAL MEDICINE AND REHAB | Age: 58
End: 2023-05-05

## 2023-05-09 ENCOUNTER — APPOINTMENT (OUTPATIENT)
Dept: GENERAL RADIOLOGY | Age: 58
End: 2023-05-09
Attending: ANESTHESIOLOGY
Payer: MEDICAID

## 2023-05-09 ENCOUNTER — HOSPITAL ENCOUNTER (OUTPATIENT)
Age: 58
Setting detail: OUTPATIENT SURGERY
Discharge: HOME | End: 2023-05-09
Attending: ANESTHESIOLOGY | Admitting: ANESTHESIOLOGY
Payer: MEDICAID

## 2023-05-09 VITALS
HEIGHT: 65 IN | HEART RATE: 55 BPM | SYSTOLIC BLOOD PRESSURE: 120 MMHG | TEMPERATURE: 96.7 F | RESPIRATION RATE: 16 BRPM | DIASTOLIC BLOOD PRESSURE: 58 MMHG | OXYGEN SATURATION: 99 % | BODY MASS INDEX: 37.42 KG/M2 | WEIGHT: 224.6 LBS

## 2023-05-09 PROCEDURE — 3209999900 FLUORO FOR SURGICAL PROCEDURES

## 2023-05-09 PROCEDURE — 2500000003 HC RX 250 WO HCPCS: Performed by: ANESTHESIOLOGY

## 2023-05-09 PROCEDURE — 3600000054 HC PAIN LEVEL 3 BASE: Performed by: ANESTHESIOLOGY

## 2023-05-09 PROCEDURE — 6360000004 HC RX CONTRAST MEDICATION: Performed by: ANESTHESIOLOGY

## 2023-05-09 PROCEDURE — 27096 INJECT SACROILIAC JOINT: CPT | Performed by: ANESTHESIOLOGY

## 2023-05-09 PROCEDURE — 7100000010 HC PHASE II RECOVERY - FIRST 15 MIN: Performed by: ANESTHESIOLOGY

## 2023-05-09 PROCEDURE — 2709999900 HC NON-CHARGEABLE SUPPLY: Performed by: ANESTHESIOLOGY

## 2023-05-09 PROCEDURE — 6360000002 HC RX W HCPCS: Performed by: ANESTHESIOLOGY

## 2023-05-09 RX ORDER — BUPIVACAINE HYDROCHLORIDE 2.5 MG/ML
INJECTION, SOLUTION EPIDURAL; INFILTRATION; INTRACAUDAL PRN
Status: DISCONTINUED | OUTPATIENT
Start: 2023-05-09 | End: 2023-05-09 | Stop reason: ALTCHOICE

## 2023-05-09 RX ORDER — LIDOCAINE HYDROCHLORIDE 10 MG/ML
INJECTION, SOLUTION EPIDURAL; INFILTRATION; INTRACAUDAL; PERINEURAL PRN
Status: DISCONTINUED | OUTPATIENT
Start: 2023-05-09 | End: 2023-05-09 | Stop reason: ALTCHOICE

## 2023-05-09 RX ORDER — VALACYCLOVIR HYDROCHLORIDE 1 G/1
1000 TABLET, FILM COATED ORAL 3 TIMES DAILY
COMMUNITY
Start: 2023-04-26

## 2023-05-09 RX ORDER — METHYLPREDNISOLONE ACETATE 80 MG/ML
INJECTION, SUSPENSION INTRA-ARTICULAR; INTRALESIONAL; INTRAMUSCULAR; SOFT TISSUE PRN
Status: DISCONTINUED | OUTPATIENT
Start: 2023-05-09 | End: 2023-05-09 | Stop reason: ALTCHOICE

## 2023-05-09 ASSESSMENT — PAIN SCALES - GENERAL: PAINLEVEL_OUTOF10: 0

## 2023-05-09 ASSESSMENT — PAIN - FUNCTIONAL ASSESSMENT
PAIN_FUNCTIONAL_ASSESSMENT: PREVENTS OR INTERFERES SOME ACTIVE ACTIVITIES AND ADLS
PAIN_FUNCTIONAL_ASSESSMENT: 0-10

## 2023-05-09 ASSESSMENT — PAIN DESCRIPTION - DESCRIPTORS: DESCRIPTORS: ACHING;DULL

## 2023-05-09 NOTE — PROGRESS NOTES
0754-Patient to Phase II. Report received from surgical RN. Patient awake and alert. Vital signs obtained and stable. Respirations unlabored on room air. Patient denies pain and nausea. Denies numbness and tingling in extremities. Injection site open to air and no drainage noted. Patient instructed to stay in bed. Call light in reach.        0800-Pt discharged home with family in stable condition

## 2023-05-09 NOTE — PROCEDURES
Pre-operative Diagnosis:  SI joint pain     Post-operative Diagnosis:  SI joint pain     Procedure: Bilateral SI joint injection     Procedure Description:  After having signed the informed consent, the patient was placed in the prone position. The patient's back was prepped with chloraprep solution, and draped in a sterile fashion. A total of 2 ml of 1% lidocaine were used to anesthetize the skin and underlying tissues. Under fluoroscopic guidance a single 22-gauge, 3.5 inch spinal needle was advanced to lie within the inferior pole of the RIGHT sacroiliac joint. There were no paresthesias or heme aspiration. Needle placement was confirmed in the AP view. After negative aspiration, 0.5 ml of Omnipaque 300 contrast was injected with appropriate spread observed. A total of 2 ml of 0.5% bupivicaine mixed with 40 mg depo-medrol were injected into the sacroiliac joint. The needle was withdrawn without any complications. This exact procedure was repeated on the contralateral side. The patient tolerated the procedure well, was transported to the recovery room and observed for 15 minutes and discharged in an ambulatory fashion. No immediate reported complications.     Procedural Complications: None  Estimated Blood Loss: 0 mL    Sergey Barcenas DO  Interventional Pain Management/PM&R   . [facility] and [facility]

## 2023-05-16 ENCOUNTER — OFFICE VISIT (OUTPATIENT)
Dept: RHEUMATOLOGY | Age: 58
End: 2023-05-16
Payer: MEDICAID

## 2023-05-16 ENCOUNTER — HOSPITAL ENCOUNTER (OUTPATIENT)
Age: 58
Discharge: HOME | End: 2023-05-16
Payer: MEDICAID

## 2023-05-16 VITALS
DIASTOLIC BLOOD PRESSURE: 80 MMHG | WEIGHT: 223.4 LBS | SYSTOLIC BLOOD PRESSURE: 130 MMHG | HEART RATE: 56 BPM | BODY MASS INDEX: 37.22 KG/M2 | OXYGEN SATURATION: 96 % | HEIGHT: 65 IN

## 2023-05-16 DIAGNOSIS — Z79.899 ENCOUNTER FOR LONG-TERM (CURRENT) USE OF HIGH-RISK MEDICATION: ICD-10-CM

## 2023-05-16 DIAGNOSIS — M46.1 SACROILIITIS (HCC): ICD-10-CM

## 2023-05-16 DIAGNOSIS — L40.50 PSORIATIC ARTHRITIS (HCC): ICD-10-CM

## 2023-05-16 DIAGNOSIS — L40.50 PSORIATIC ARTHRITIS (HCC): Primary | ICD-10-CM

## 2023-05-16 LAB
ALBUMIN SERPL BCG-MCNC: 4.4 G/DL (ref 3.5–5.1)
ALP SERPL-CCNC: 89 U/L (ref 38–126)
ALT SERPL W/O P-5'-P-CCNC: 15 U/L (ref 11–66)
ANION GAP SERPL CALC-SCNC: 8 MEQ/L (ref 8–16)
AST SERPL-CCNC: 14 U/L (ref 5–40)
BASOPHILS ABSOLUTE: 0.1 THOU/MM3 (ref 0–0.1)
BASOPHILS NFR BLD AUTO: 1.2 %
BILIRUB SERPL-MCNC: 0.3 MG/DL (ref 0.3–1.2)
BUN SERPL-MCNC: 17 MG/DL (ref 7–22)
CALCIUM SERPL-MCNC: 9.4 MG/DL (ref 8.5–10.5)
CHLORIDE SERPL-SCNC: 106 MEQ/L (ref 98–111)
CO2 SERPL-SCNC: 27 MEQ/L (ref 23–33)
CREAT SERPL-MCNC: 1 MG/DL (ref 0.4–1.2)
CRP SERPL-MCNC: < 0.3 MG/DL (ref 0–1)
DEPRECATED RDW RBC AUTO: 53.6 FL (ref 35–45)
EOSINOPHIL NFR BLD AUTO: 1.5 %
EOSINOPHILS ABSOLUTE: 0.1 THOU/MM3 (ref 0–0.4)
ERYTHROCYTE [DISTWIDTH] IN BLOOD BY AUTOMATED COUNT: 15.7 % (ref 11.5–14.5)
ERYTHROCYTE [SEDIMENTATION RATE] IN BLOOD BY WESTERGREN METHOD: 21 MM/HR (ref 0–20)
GFR SERPL CREATININE-BSD FRML MDRD: > 60 ML/MIN/1.73M2
GLUCOSE SERPL-MCNC: 99 MG/DL (ref 70–108)
HCT VFR BLD AUTO: 43.1 % (ref 37–47)
HGB BLD-MCNC: 13.7 GM/DL (ref 12–16)
IMM GRANULOCYTES # BLD AUTO: 0.03 THOU/MM3 (ref 0–0.07)
IMM GRANULOCYTES NFR BLD AUTO: 0.3 %
LYMPHOCYTES ABSOLUTE: 2.3 THOU/MM3 (ref 1–4.8)
LYMPHOCYTES NFR BLD AUTO: 24.8 %
MCH RBC QN AUTO: 30 PG (ref 26–33)
MCHC RBC AUTO-ENTMCNC: 31.8 GM/DL (ref 32.2–35.5)
MCV RBC AUTO: 94.3 FL (ref 81–99)
MONOCYTES ABSOLUTE: 0.6 THOU/MM3 (ref 0.4–1.3)
MONOCYTES NFR BLD AUTO: 6.6 %
NEUTROPHILS NFR BLD AUTO: 65.6 %
NRBC BLD AUTO-RTO: 0 /100 WBC
PLATELET # BLD AUTO: 366 THOU/MM3 (ref 130–400)
PMV BLD AUTO: 10.6 FL (ref 9.4–12.4)
POTASSIUM SERPL-SCNC: 4.2 MEQ/L (ref 3.5–5.2)
PROT SERPL-MCNC: 6.8 G/DL (ref 6.1–8)
RBC # BLD AUTO: 4.57 MILL/MM3 (ref 4.2–5.4)
SEGMENTED NEUTROPHILS ABSOLUTE COUNT: 6 THOU/MM3 (ref 1.8–7.7)
SODIUM SERPL-SCNC: 141 MEQ/L (ref 135–145)
WBC # BLD AUTO: 9.2 THOU/MM3 (ref 4.8–10.8)

## 2023-05-16 PROCEDURE — 86140 C-REACTIVE PROTEIN: CPT

## 2023-05-16 PROCEDURE — 85651 RBC SED RATE NONAUTOMATED: CPT

## 2023-05-16 PROCEDURE — 85025 COMPLETE CBC W/AUTO DIFF WBC: CPT

## 2023-05-16 PROCEDURE — 36415 COLL VENOUS BLD VENIPUNCTURE: CPT

## 2023-05-16 PROCEDURE — 80053 COMPREHEN METABOLIC PANEL: CPT

## 2023-05-16 PROCEDURE — 99214 OFFICE O/P EST MOD 30 MIN: CPT | Performed by: INTERNAL MEDICINE

## 2023-05-16 RX ORDER — LEFLUNOMIDE 20 MG/1
20 TABLET ORAL DAILY
Qty: 30 TABLET | Refills: 1 | Status: SHIPPED | OUTPATIENT
Start: 2023-05-16 | End: 2023-06-15

## 2023-05-16 ASSESSMENT — ENCOUNTER SYMPTOMS
SHORTNESS OF BREATH: 0
NAUSEA: 0
COUGH: 0
VOMITING: 0
ABDOMINAL PAIN: 0

## 2023-05-16 NOTE — PROGRESS NOTES
Sitting, Cuff Size: Large Adult)   Pulse 56   Ht 5' 5\" (1.651 m)   Wt 223 lb 6.4 oz (101.3 kg)   SpO2 96%   BMI 37.18 kg/m²     Physical Exam  Constitutional:       General: She is not in acute distress. Appearance: Normal appearance. Eyes:      Conjunctiva/sclera: Conjunctivae normal.   Pulmonary:      Effort: Pulmonary effort is normal.   Musculoskeletal:         General: No swelling, tenderness or deformity. Normal range of motion. Cervical back: Neck supple. Skin:     General: Skin is warm and dry. Findings: No rash. Neurological:      General: No focal deficit present. Mental Status: She is alert and oriented to person, place, and time.       Gait: Gait normal.   Psychiatric:         Mood and Affect: Mood normal.          DATA:     Latest Reference Range & Units 03/20/23 12:10   Sodium 135 - 145 meq/L 140   Potassium 3.5 - 5.2 meq/L 4.4   Chloride 98 - 111 meq/L 107   CO2 23 - 33 meq/L 29   BUN,BUNPL 7 - 22 mg/dL 16   Creatinine 0.4 - 1.2 mg/dL 1.0   Anion Gap 8.0 - 16.0 meq/L 4.0 (L)   Est, Glom Filt Rate >60 ml/min/1.73m2 >60   Glucose, Random 70 - 108 mg/dL 86   CALCIUM, SERUM, 334554 8.5 - 10.5 mg/dL 9.2   Total Protein 6.1 - 8.0 g/dL 6.8   URIC ACID,URA 2.4 - 5.7 mg/dL 3.5      Latest Reference Range & Units 03/20/23 12:10   CRP 0.00 - 1.00 mg/dl 0.40      Latest Reference Range & Units 03/20/23 12:10   Albumin 3.5 - 5.1 g/dL 4.4   Alk Phos 38 - 126 U/L 82   ALT 11 - 66 U/L 17   AST 5 - 40 U/L 14   BILIRUBIN TOTAL 0.3 - 1.2 mg/dL 0.3   Total Protein 6.1 - 8.0 g/dL 6.8      Latest Reference Range & Units 03/20/23 12:10   WBC 4.8 - 10.8 thou/mm3 7.9   RBC 4.20 - 5.40 mill/mm3 4.36   Hemoglobin Quant 12.0 - 16.0 gm/dl 13.0   Hematocrit 37.0 - 47.0 % 40.0   MCV 81.0 - 99.0 fL 91.7   MCH 26.0 - 33.0 pg 29.8   MCHC 32.2 - 35.5 gm/dl 32.5   MPV 9.4 - 12.4 fL 10.1   RDW-CV 11.5 - 14.5 % 13.7   RDW-SD 35.0 - 45.0 fL 46.4 (H)   Platelet Count 402 - 400 thou/mm3 341      Latest Reference

## 2023-06-08 ENCOUNTER — OFFICE VISIT (OUTPATIENT)
Dept: PHYSICAL MEDICINE AND REHAB | Age: 58
End: 2023-06-08
Payer: MEDICAID

## 2023-06-08 VITALS
HEIGHT: 65 IN | WEIGHT: 223 LBS | BODY MASS INDEX: 37.15 KG/M2 | SYSTOLIC BLOOD PRESSURE: 144 MMHG | DIASTOLIC BLOOD PRESSURE: 84 MMHG

## 2023-06-08 DIAGNOSIS — M70.62 GREATER TROCHANTERIC BURSITIS OF BOTH HIPS: ICD-10-CM

## 2023-06-08 DIAGNOSIS — G89.4 CHRONIC PAIN SYNDROME: ICD-10-CM

## 2023-06-08 DIAGNOSIS — M53.3 SACROILIAC JOINT DYSFUNCTION OF BOTH SIDES: ICD-10-CM

## 2023-06-08 DIAGNOSIS — M70.61 GREATER TROCHANTERIC BURSITIS OF BOTH HIPS: ICD-10-CM

## 2023-06-08 DIAGNOSIS — M15.9 OSTEOARTHRITIS OF MULTIPLE JOINTS, UNSPECIFIED OSTEOARTHRITIS TYPE: ICD-10-CM

## 2023-06-08 DIAGNOSIS — M47.816 LUMBAR SPONDYLOSIS: ICD-10-CM

## 2023-06-08 DIAGNOSIS — R29.898 WEAKNESS OF BOTH LOWER EXTREMITIES: Primary | ICD-10-CM

## 2023-06-08 PROCEDURE — 99214 OFFICE O/P EST MOD 30 MIN: CPT | Performed by: NURSE PRACTITIONER

## 2023-06-08 NOTE — PROGRESS NOTES
Chronic Pain/PM&R Clinic Note     Encounter Date: 8/4/22    Subjective:   Chief Complaint:   Chief Complaint   Patient presents with    Follow Up After Procedure     bilateral sacroiliac joint injection 5/9/23       History of Present Illness:   Iris Diallo is a 60 y.o. female seen in the clinic initially on 02/09/2022 upon request from [address], [place], DO for her history of bilateral knee, right shoulder and left elbow pain. Patient states her biggest pain generator at this time is her bilateral hips. Patient states she has had hip pain for over 10 years. She initially started seeing Dr. Lane Martinez at [facility] in 2012 or 2013 for this issue. Patient states at that time she did have an MRI of her bilateral hips but she does not recall the results. She was never told that she had any significant findings. Patient states she currently uses a back brace to assist with her low back pain. She will only wear this when she is out and about or driving. Patient states last time she did physical therapy for her hips was around 2016. She states this was not beneficial.  Patient denies any recent imaging of her hips. Patient states she does have a history of degenerative disc disease. Patient states pain is aggravated with activity, standing, sitting. She states her right hip is significantly worse than her left. She cannot lay on her right side. Patient states she is not sleeping well due to pain. Patient states she takes Tylenol and Aleve which do help take the edge off. Patient states when she is in significant pain she has to sit down and rest.  He also provides minimal relief, while ice is ineffective. Patient denies use of an assistive device. She denies history of falls. Patient does state that she noticed that she limps when she walks, especially after increased activity. Patient states she is currently living at home with her boyfriend.   Patient states she is not currently working as she is trying to get

## 2023-06-09 RX ORDER — PREGABALIN 75 MG/1
75 CAPSULE ORAL 2 TIMES DAILY
Qty: 60 CAPSULE | Refills: 0 | Status: SHIPPED | OUTPATIENT
Start: 2023-06-09 | End: 2023-07-09

## 2023-06-09 RX ORDER — MELOXICAM 15 MG/1
TABLET ORAL
Qty: 30 TABLET | Refills: 2 | Status: SHIPPED | OUTPATIENT
Start: 2023-06-09

## 2023-07-05 RX ORDER — TIZANIDINE 4 MG/1
TABLET ORAL
Qty: 90 TABLET | Refills: 2 | Status: SHIPPED | OUTPATIENT
Start: 2023-07-12 | End: 2023-10-10

## 2023-07-05 NOTE — TELEPHONE ENCOUNTER
OARRS reviewed. UDS: negative   Last seen: 6/8/2023.  Follow-up:   Future Appointments   Date Time Provider [address]   7/6/2023 11:00 AM MARBIN Garg - CNP [facility]   7/17/2023 11:20 AM MD Marysol Acevesffer Rheum [facility] [place]

## 2023-07-06 ENCOUNTER — OFFICE VISIT (OUTPATIENT)
Dept: PHYSICAL MEDICINE AND REHAB | Age: 58
End: 2023-07-06
Payer: MEDICAID

## 2023-07-06 VITALS
DIASTOLIC BLOOD PRESSURE: 72 MMHG | HEIGHT: 65 IN | BODY MASS INDEX: 37.15 KG/M2 | SYSTOLIC BLOOD PRESSURE: 136 MMHG | WEIGHT: 223 LBS

## 2023-07-06 DIAGNOSIS — R29.898 WEAKNESS OF BOTH LOWER EXTREMITIES: Primary | ICD-10-CM

## 2023-07-06 DIAGNOSIS — G89.4 CHRONIC PAIN SYNDROME: ICD-10-CM

## 2023-07-06 DIAGNOSIS — M47.816 LUMBAR SPONDYLOSIS: ICD-10-CM

## 2023-07-06 DIAGNOSIS — M25.551 RIGHT HIP PAIN: ICD-10-CM

## 2023-07-06 DIAGNOSIS — M53.3 SACROILIAC JOINT DYSFUNCTION OF BOTH SIDES: ICD-10-CM

## 2023-07-06 PROCEDURE — 99214 OFFICE O/P EST MOD 30 MIN: CPT | Performed by: NURSE PRACTITIONER

## 2023-07-06 RX ORDER — PREGABALIN 75 MG/1
75 CAPSULE ORAL 2 TIMES DAILY
Qty: 60 CAPSULE | Refills: 1 | Status: SHIPPED | OUTPATIENT
Start: 2023-07-06 | End: 2023-09-04

## 2023-07-06 NOTE — PROGRESS NOTES
Functionality Assessment/Goals Worksheet     On a scale of 0 (Does not Interfere) to 10 (Completely Interferes)     1. Which number describes how during the past week pain has interfered with           the following:  A. General Activity:  6  B. Mood: 2  C. Walking Ability:  5  D. Normal Work (Includes both work outside the home and housework):  6  E. Relations with Other People:   0  F. Sleep:   2  G. Enjoyment of Life:   0    2. Patient Prefers to Take their Pain Medications:     [x]  On a regular basis   []  Only when necessary    []  Does not take pain medications    3. What are the Patient's Goals/Expectations for Visiting Pain Management?      [x]  Learn about my pain    []  Receive Medication   []  Physical Therapy     []  Treat Depression   []  Receive Injections    []  Treat Sleep   []  Deal with Anxiety and Stress   []  Treat Opoid Dependence/Addiction   []  Other:
MANAGEMENT PROCEDURE Bilateral 1/10/2023    Lumbar 4/5, 5/Sacral 1 facet steroid injection bilateral performed by Vertis Rubinstein, DO at [facility]     TUBAL LIGATION         Family History   Problem Relation Age of Onset    Other Mother          Medications & Allergies:   Current Outpatient Medications   Medication Instructions    atorvastatin (LIPITOR) 40 mg, Oral, DAILY    clobetasol (TEMOVATE) 0.05 % cream Topical, 2 TIMES DAILY, Apply topically 2 times daily. clobetasol (TEMOVATE) 0.05 % external solution apply to affected area once daily    ketoconazole (NIZORAL) 2 % cream Topical, DAILY, Apply topically daily.     leflunomide (ARAVA) 20 mg, Oral, DAILY    losartan (COZAAR) 100 mg, Oral, DAILY    meloxicam (MOBIC) 15 MG tablet take 1 tablet by mouth every morning    pregabalin (LYRICA) 75 mg, Oral, 2 TIMES DAILY    PROAIR  (90 Base) MCG/ACT inhaler inhale 2 puffs by mouth every 6 hours if needed    [START ON 7/12/2023] tiZANidine (ZANAFLEX) 4 MG tablet take 1 tablet by mouth three times a day if needed for pain    valACYclovir (VALTREX) 1,000 mg, Oral, 3 TIMES DAILY       No Known Allergies    Review of Systems:   Constitutional: negative for weight changes or fevers  Genitourinary: negative for bowel/bladder incontinence   Musculoskeletal: positive for low back pain, right hip pain, widespread joint pain  Neurological: negative for any leg weakness or numbness/tingling  Behavioral/Psych: negative for anxiety/depression   All other systems reviewed and are negative    Objective:     Vitals:    07/06/23 1128   BP: 136/72         Constitutional: Pleasant, no acute distress   Head: Normocephalic, atraumatic   Eyes: Conjunctivae normal   Neck: Supple, symmetrical   Lungs: Normal respiratory effort, non-labored breathing   Cardiovascular: Limbs warm and well perfused   Abdomen: Non-protruded   Musculoskeletal: Muscle bulk symmetric, no atrophy, no gross deformities

## 2023-07-09 DIAGNOSIS — L40.50 PSORIATIC ARTHRITIS (HCC): ICD-10-CM

## 2023-07-09 DIAGNOSIS — Z79.899 ENCOUNTER FOR LONG-TERM (CURRENT) USE OF HIGH-RISK MEDICATION: ICD-10-CM

## 2023-07-10 RX ORDER — LEFLUNOMIDE 20 MG/1
TABLET ORAL
Qty: 30 TABLET | Refills: 0 | Status: SHIPPED | OUTPATIENT
Start: 2023-07-10

## 2023-07-11 ENCOUNTER — HOSPITAL ENCOUNTER (OUTPATIENT)
Age: 58
Discharge: HOME | End: 2023-07-11
Payer: MEDICAID

## 2023-07-11 LAB
ALBUMIN SERPL BCG-MCNC: 3.7 G/DL (ref 3.5–5.1)
ALP SERPL-CCNC: 92 U/L (ref 38–126)
ALT SERPL W/O P-5'-P-CCNC: 18 U/L (ref 11–66)
ANION GAP SERPL CALC-SCNC: 10 MEQ/L (ref 8–16)
AST SERPL-CCNC: 16 U/L (ref 5–40)
BASOPHILS ABSOLUTE: 0.1 THOU/MM3 (ref 0–0.1)
BASOPHILS NFR BLD AUTO: 1.4 %
BILIRUB SERPL-MCNC: 0.3 MG/DL (ref 0.3–1.2)
BUN SERPL-MCNC: 11 MG/DL (ref 7–22)
CALCIUM SERPL-MCNC: 8.8 MG/DL (ref 8.5–10.5)
CHLORIDE SERPL-SCNC: 108 MEQ/L (ref 98–111)
CO2 SERPL-SCNC: 23 MEQ/L (ref 23–33)
CREAT SERPL-MCNC: 1 MG/DL (ref 0.4–1.2)
CRP SERPL-MCNC: 0.97 MG/DL (ref 0–1)
DEPRECATED RDW RBC AUTO: 53.1 FL (ref 35–45)
EOSINOPHIL NFR BLD AUTO: 2.7 %
EOSINOPHILS ABSOLUTE: 0.2 THOU/MM3 (ref 0–0.4)
ERYTHROCYTE [DISTWIDTH] IN BLOOD BY AUTOMATED COUNT: 15.3 % (ref 11.5–14.5)
ERYTHROCYTE [SEDIMENTATION RATE] IN BLOOD BY WESTERGREN METHOD: 30 MM/HR (ref 0–20)
GFR SERPL CREATININE-BSD FRML MDRD: > 60 ML/MIN/1.73M2
GLUCOSE SERPL-MCNC: 75 MG/DL (ref 70–108)
HCT VFR BLD AUTO: 40.5 % (ref 37–47)
HGB BLD-MCNC: 13 GM/DL (ref 12–16)
IMM GRANULOCYTES # BLD AUTO: 0.02 THOU/MM3 (ref 0–0.07)
IMM GRANULOCYTES NFR BLD AUTO: 0.3 %
LYMPHOCYTES ABSOLUTE: 1.7 THOU/MM3 (ref 1–4.8)
LYMPHOCYTES NFR BLD AUTO: 26 %
MCH RBC QN AUTO: 30.4 PG (ref 26–33)
MCHC RBC AUTO-ENTMCNC: 32.1 GM/DL (ref 32.2–35.5)
MCV RBC AUTO: 94.6 FL (ref 81–99)
MONOCYTES ABSOLUTE: 0.7 THOU/MM3 (ref 0.4–1.3)
MONOCYTES NFR BLD AUTO: 10.4 %
NEUTROPHILS NFR BLD AUTO: 59.2 %
NRBC BLD AUTO-RTO: 0 /100 WBC
PLATELET # BLD AUTO: 281 THOU/MM3 (ref 130–400)
PMV BLD AUTO: 11 FL (ref 9.4–12.4)
POTASSIUM SERPL-SCNC: 3.6 MEQ/L (ref 3.5–5.2)
PROT SERPL-MCNC: 6.5 G/DL (ref 6.1–8)
RBC # BLD AUTO: 4.28 MILL/MM3 (ref 4.2–5.4)
SEGMENTED NEUTROPHILS ABSOLUTE COUNT: 3.9 THOU/MM3 (ref 1.8–7.7)
SODIUM SERPL-SCNC: 141 MEQ/L (ref 135–145)
WBC # BLD AUTO: 6.6 THOU/MM3 (ref 4.8–10.8)

## 2023-07-11 PROCEDURE — 86140 C-REACTIVE PROTEIN: CPT

## 2023-07-11 PROCEDURE — 36415 COLL VENOUS BLD VENIPUNCTURE: CPT

## 2023-07-11 PROCEDURE — 80053 COMPREHEN METABOLIC PANEL: CPT

## 2023-07-11 PROCEDURE — 85025 COMPLETE CBC W/AUTO DIFF WBC: CPT

## 2023-07-11 PROCEDURE — 85651 RBC SED RATE NONAUTOMATED: CPT

## 2023-10-03 RX ORDER — TIZANIDINE 4 MG/1
TABLET ORAL
Qty: 90 TABLET | Refills: 2 | OUTPATIENT
Start: 2023-10-03 | End: 2024-01-01

## 2023-10-24 ENCOUNTER — OFFICE VISIT (OUTPATIENT)
Dept: PHYSICAL MEDICINE AND REHAB | Age: 58
End: 2023-10-24
Payer: MEDICAID

## 2023-10-24 VITALS
DIASTOLIC BLOOD PRESSURE: 78 MMHG | SYSTOLIC BLOOD PRESSURE: 120 MMHG | WEIGHT: 200 LBS | HEIGHT: 65 IN | BODY MASS INDEX: 33.32 KG/M2

## 2023-10-24 DIAGNOSIS — G89.4 CHRONIC PAIN SYNDROME: ICD-10-CM

## 2023-10-24 DIAGNOSIS — M53.3 SACROILIAC JOINT DYSFUNCTION OF BOTH SIDES: ICD-10-CM

## 2023-10-24 DIAGNOSIS — M47.816 LUMBAR SPONDYLOSIS: Primary | ICD-10-CM

## 2023-10-24 DIAGNOSIS — M15.9 OSTEOARTHRITIS OF MULTIPLE JOINTS, UNSPECIFIED OSTEOARTHRITIS TYPE: ICD-10-CM

## 2023-10-24 PROCEDURE — 99214 OFFICE O/P EST MOD 30 MIN: CPT | Performed by: NURSE PRACTITIONER

## 2023-10-24 RX ORDER — PREGABALIN 75 MG/1
75 CAPSULE ORAL 2 TIMES DAILY
Qty: 60 CAPSULE | Refills: 2 | Status: SHIPPED | OUTPATIENT
Start: 2023-10-24 | End: 2024-01-22

## 2023-10-24 RX ORDER — MELOXICAM 15 MG/1
TABLET ORAL
Qty: 30 TABLET | Refills: 2 | Status: SHIPPED | OUTPATIENT
Start: 2023-10-24

## 2023-10-24 NOTE — PROGRESS NOTES
Functionality Assessment/Goals Worksheet     On a scale of 0 (Does not Interfere) to 10 (Completely Interferes)     1. Which number describes how during the past week pain has interfered with           the following:  A. General Activity:  7  B. Mood: 4  C. Walking Ability:  8  D. Normal Work (Includes both work outside the home and housework):  8  E. Relations with Other People:   4  F. Sleep:   7  G. Enjoyment of Life:   5    2. Patient Prefers to Take their Pain Medications:     [x]  On a regular basis   []  Only when necessary    []  Does not take pain medications    3. What are the Patient's Goals/Expectations for Visiting Pain Management?      [x]  Learn about my pain    []  Receive Medication   []  Physical Therapy     []  Treat Depression   []  Receive Injections    []  Treat Sleep   []  Deal with Anxiety and Stress   []  Treat Opoid Dependence/Addiction   []  Other:
[employer] at [facility] where she did a lot of heavy lifting. 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 Patient denies associated leg weakness, saddle anesthesia, leg paresthesias, or bowel or bladder incontinence. Of note, patient has several pain generators such as bilateral knee pain, right shoulder pain, left elbow pain, low back pain. Patient denies history of fibromyalgia, any inflammatory conditions. But she does states she has diffuse joint pain. Patient brought in a list of prior diagnoses from Dr. Susanne Marc. The list includes lumbar DDD, lumbar spondylosis, impingement syndrome of bilateral shoulders, osteoarthritis of left and right shoulder, lumbar spinal stenosis. She is also taking David Awad from a dermatologist for history of psoriasis. Patient states she had surgery on her right shoulder in July 2018 where they placed 6 screws, this is due to her history with osteoarthritis. The surgery was done at [facility]. Today, 7/6/2023, patient presents for planned follow-up on chronic pain. She states she started the Lyrica 75 mg twice a day and feels that has helped her significantly. She states the only side effect she is really having is decreased appetite but she has still been eating her regular meals and she does not feel that this has been much of an issue. She states the feeling she was having in her leg with muscle spasms and having them feel like they are\" excited\" has calm down since starting the Lyrica. She states really her biggest issue is her right hip pain all the way from posteriorly, laterally, and anteriorly. She states this pain is worse with walking or activity. She states she is able to sleep better at night and will on her right side without any issues. Overall, she is pleased with how she is doing and she would like to hold off on any other injections at this time.   She also

## 2023-11-22 ENCOUNTER — OFFICE VISIT (OUTPATIENT)
Dept: PHYSICAL MEDICINE AND REHAB | Age: 58
End: 2023-11-22
Payer: MEDICAID

## 2023-11-22 VITALS
HEIGHT: 64 IN | DIASTOLIC BLOOD PRESSURE: 82 MMHG | BODY MASS INDEX: 34.14 KG/M2 | SYSTOLIC BLOOD PRESSURE: 114 MMHG | WEIGHT: 199.96 LBS

## 2023-11-22 DIAGNOSIS — G89.4 CHRONIC PAIN SYNDROME: Primary | ICD-10-CM

## 2023-11-22 DIAGNOSIS — M53.3 SACROILIAC JOINT DYSFUNCTION OF BOTH SIDES: ICD-10-CM

## 2023-11-22 DIAGNOSIS — M70.61 GREATER TROCHANTERIC BURSITIS OF RIGHT HIP: ICD-10-CM

## 2023-11-22 DIAGNOSIS — M47.816 LUMBAR SPONDYLOSIS: ICD-10-CM

## 2023-11-22 DIAGNOSIS — M25.551 RIGHT HIP PAIN: ICD-10-CM

## 2023-11-22 DIAGNOSIS — M79.18 MYOFASCIAL PAIN: ICD-10-CM

## 2023-11-22 PROCEDURE — 99214 OFFICE O/P EST MOD 30 MIN: CPT | Performed by: NURSE PRACTITIONER

## 2023-11-22 NOTE — PROGRESS NOTES
Functionality Assessment/Goals Worksheet     On a scale of 0 (Does not Interfere) to 10 (Completely Interferes)     1. Which number describes how during the past week pain has interfered with           the following:  A. General Activity:  4  B. Mood: 1  C. Walking Ability:  5  D. Normal Work (Includes both work outside the home and housework):  5  E. Relations with Other People:   1  F. Sleep:   4  G. Enjoyment of Life:   1    2. Patient Prefers to Take their Pain Medications:     [x]  On a regular basis   []  Only when necessary    []  Does not take pain medications    3. What are the Patient's Goals/Expectations for Visiting Pain Management?      [x]  Learn about my pain    []  Receive Medication   []  Physical Therapy     []  Treat Depression   []  Receive Injections    []  Treat Sleep   []  Deal with Anxiety and Stress   []  Treat Opoid Dependence/Addiction   []  Other:

## 2023-11-22 NOTE — PROGRESS NOTES
Chronic Pain/PM&R Clinic Note     Encounter Date: 8/4/22    Subjective:   Chief Complaint:   Chief Complaint   Patient presents with    Follow-up    Medication Refill     Meloxicam        History of Present Illness:   Carlos Alberto Alvarez is a 60 y.o. female seen in the clinic initially on 02/09/2022 upon request from [facility], [address] [place] [address] for her history of bilateral knee, right shoulder and left elbow pain. Patient states her biggest pain generator at this time is her bilateral hips. Patient states she has had hip pain for over 10 years. She initially started seeing Dr. Yahir Anaya at [facility] in 2012 or 2013 for this issue. Patient states at that time she did have an MRI of her bilateral hips but she does not recall the results. She was never told that she had any significant findings. Patient states she currently uses a back brace to assist with her low back pain. She will only wear this when she is out and about or driving. Patient states last time she did physical therapy for her hips was around 2016. She states this was not beneficial.  Patient denies any recent imaging of her hips. Patient states she does have a history of degenerative disc disease. Patient states pain is aggravated with activity, standing, sitting. She states her right hip is significantly worse than her left. She cannot lay on her right side. Patient states she is not sleeping well due to pain. Patient states she takes Tylenol and Aleve which do help take the edge off. Patient states when she is in significant pain she has to sit down and rest.  He also provides minimal relief, while ice is ineffective. Patient denies use of an assistive device. She denies history of falls. Patient does state that she noticed that she limps when she walks, especially after increased activity. Patient states she is currently living at home with her boyfriend. Patient states she is not currently working as she is trying to get disability.   Patient used to

## 2024-01-04 ENCOUNTER — OFFICE VISIT (OUTPATIENT)
Dept: RHEUMATOLOGY | Age: 59
End: 2024-01-04
Payer: MEDICAID

## 2024-01-04 VITALS
OXYGEN SATURATION: 97 % | DIASTOLIC BLOOD PRESSURE: 72 MMHG | HEART RATE: 69 BPM | BODY MASS INDEX: 34.14 KG/M2 | WEIGHT: 199.96 LBS | HEIGHT: 64 IN | SYSTOLIC BLOOD PRESSURE: 114 MMHG

## 2024-01-04 DIAGNOSIS — Z79.899 ENCOUNTER FOR LONG-TERM (CURRENT) USE OF HIGH-RISK MEDICATION: ICD-10-CM

## 2024-01-04 DIAGNOSIS — L40.50 PSORIATIC ARTHRITIS (HCC): ICD-10-CM

## 2024-01-04 PROCEDURE — 99214 OFFICE O/P EST MOD 30 MIN: CPT | Performed by: INTERNAL MEDICINE

## 2024-01-04 RX ORDER — LEFLUNOMIDE 20 MG/1
20 TABLET ORAL DAILY
Qty: 30 TABLET | Refills: 1 | Status: SHIPPED | OUTPATIENT
Start: 2024-01-04

## 2024-01-04 RX ORDER — PREDNISONE 10 MG/1
TABLET ORAL
Qty: 18 TABLET | Refills: 0 | Status: SHIPPED | OUTPATIENT
Start: 2024-01-04 | End: 2024-01-13

## 2024-01-04 ASSESSMENT — ENCOUNTER SYMPTOMS
ABDOMINAL PAIN: 0
NAUSEA: 0
SHORTNESS OF BREATH: 0
COUGH: 0
VOMITING: 0

## 2024-01-04 NOTE — PROGRESS NOTES
[facility]   Rheumatology Clinic Note      1/4/2024         CHIEF COMPLAINT:    Chief Complaint   Patient presents with    Follow-up     2 month f/u Psoriatic arthritis (HCC)    Joint Pain     Generalized pain due to being off medication because of insurance changes. Pain level 4. Pt also has some hand swelling.            HISTORY OF PRESENT ILLNESS:    58 y.o. female with inflammatory arthritis (suspected psoriatic arthritis) presents for follow up. Presently, she is on leflunomide 20 mg daily.    Reports that she has been out of medication for 3 months due to insurance changes. Reports worsening joint pain since then.  Pain and swelling in the hands. Has prolonged morning stiffness.  Pain in lower back, shoulders, elbows, wrists, fingers, knees, ankles.       RECAP:  H/o right shoulder surgery for rotator cuff tear. Has 6 screws in right shoulder.    Pain in the knees, lower back and right shoulder.    She is following with pain management. Had nerve block injections. Reports that it used to help, but not as much recently.    Pain is constant achy nonradiating.  Walking and getting up from seated position aggravates the pain.  Improves with rest.    Reports having sensation of knees locking when getting up from chair. Has to stand for a minute or two to get the knee loosened.     Has h/o skin psoriasis. Well controlled with topical agents.  Reports taking Taltz in the past for her skin and joint pain that was prescribed by her dermatologist. Taltz did not help her joint pain.   Was discontinue and no further biologic agents were prescribed.      Past Medical History:     has a past medical history of Osteoarthritis and Shingles.    Past Surgical History:     has a past surgical history that includes Rotator cuff repair (Right); Carpal tunnel release (Bilateral); Tubal ligation; Cataract removal (Left); Back Injection (Bilateral, 3/1/2022); hip surgery (Right, 8/30/2022); Pain management procedure

## 2024-01-17 ENCOUNTER — OFFICE VISIT (OUTPATIENT)
Dept: PHYSICAL MEDICINE AND REHAB | Age: 59
End: 2024-01-17
Payer: MEDICAID

## 2024-01-17 VITALS
WEIGHT: 199 LBS | BODY MASS INDEX: 33.97 KG/M2 | HEIGHT: 64 IN | DIASTOLIC BLOOD PRESSURE: 86 MMHG | SYSTOLIC BLOOD PRESSURE: 128 MMHG

## 2024-01-17 DIAGNOSIS — M25.551 RIGHT HIP PAIN: ICD-10-CM

## 2024-01-17 DIAGNOSIS — M47.816 LUMBAR SPONDYLOSIS: Primary | ICD-10-CM

## 2024-01-17 DIAGNOSIS — M15.9 OSTEOARTHRITIS OF MULTIPLE JOINTS, UNSPECIFIED OSTEOARTHRITIS TYPE: ICD-10-CM

## 2024-01-17 DIAGNOSIS — M79.18 MYOFASCIAL PAIN: ICD-10-CM

## 2024-01-17 DIAGNOSIS — M70.62 GREATER TROCHANTERIC BURSITIS OF BOTH HIPS: ICD-10-CM

## 2024-01-17 DIAGNOSIS — G89.4 CHRONIC PAIN SYNDROME: ICD-10-CM

## 2024-01-17 DIAGNOSIS — M70.61 GREATER TROCHANTERIC BURSITIS OF BOTH HIPS: ICD-10-CM

## 2024-01-17 DIAGNOSIS — M53.3 SACROILIAC JOINT DYSFUNCTION OF BOTH SIDES: ICD-10-CM

## 2024-01-17 PROCEDURE — 99214 OFFICE O/P EST MOD 30 MIN: CPT | Performed by: NURSE PRACTITIONER

## 2024-01-17 RX ORDER — PREGABALIN 75 MG/1
75 CAPSULE ORAL 2 TIMES DAILY
Qty: 60 CAPSULE | Refills: 2 | Status: SHIPPED | OUTPATIENT
Start: 2024-01-17 | End: 2024-04-16

## 2024-01-17 RX ORDER — MELOXICAM 15 MG/1
TABLET ORAL
Qty: 30 TABLET | Refills: 2 | OUTPATIENT
Start: 2024-01-17

## 2024-01-17 RX ORDER — MELOXICAM 15 MG/1
TABLET ORAL
Qty: 30 TABLET | Refills: 2 | Status: SHIPPED | OUTPATIENT
Start: 2024-01-17

## 2024-01-17 NOTE — PROGRESS NOTES
Chronic Pain/PM&R Clinic Note     Encounter Date: 8/4/22    Subjective:   Chief Complaint:   Chief Complaint   Patient presents with    Follow-up    Medication Refill     Pregabalin        History of Present Illness:   Arlene Wharton is a 58 y.o. female seen in the clinic initially on 02/09/2022 upon request from Tre Tapia DO for her history of bilateral knee, right shoulder and left elbow pain.  Patient states her biggest pain generator at this time is her bilateral hips.  Patient states she has had hip pain for over 10 years.  She initially started seeing Dr. Doe at [facility] in 2012 or 2013 for this issue.  Patient states at that time she did have an MRI of her bilateral hips but she does not recall the results.  She was never told that she had any significant findings.  Patient states she currently uses a back brace to assist with her low back pain.  She will only wear this when she is out and about or driving.  Patient states last time she did physical therapy for her hips was around 2016.  She states this was not beneficial.  Patient denies any recent imaging of her hips.  Patient states she does have a history of degenerative disc disease.  Patient states pain is aggravated with activity, standing, sitting.  She states her right hip is significantly worse than her left.  She cannot lay on her right side.  Patient states she is not sleeping well due to pain.  Patient states she takes Tylenol and Aleve which do help take the edge off.  Patient states when she is in significant pain she has to sit down and rest.  He also provides minimal relief, while ice is ineffective.  Patient denies use of an assistive device.  She denies history of falls.  Patient does state that she noticed that she limps when she walks, especially after increased activity.  Patient states she is currently living at home with her boyfriend.  Patient states she is not currently working as she is trying to get disability.  Patient used to

## 2024-01-17 NOTE — PROGRESS NOTES
Functionality Assessment/Goals Worksheet     On a scale of 0 (Does not Interfere) to 10 (Completely Interferes)     1.  Which number describes how during the past week pain has interfered with           the following:  A.  General Activity:  4  B.  Mood: 1  C.  Walking Ability:  4  D.  Normal Work (Includes both work outside the home and housework):  5  E.  Relations with Other People:   0  F.  Sleep:   5  G.  Enjoyment of Life:   0    2.  Patient Prefers to Take their Pain Medications:     [x]  On a regular basis   []  Only when necessary    []  Does not take pain medications    3.  What are the Patient's Goals/Expectations for Visiting Pain Management?     [x]  Learn about my pain    []  Receive Medication   []  Physical Therapy     []  Treat Depression   []  Receive Injections    []  Treat Sleep   []  Deal with Anxiety and Stress   []  Treat Opoid Dependence/Addiction   []  Other:

## 2024-02-28 DIAGNOSIS — Z79.899 ENCOUNTER FOR LONG-TERM (CURRENT) USE OF HIGH-RISK MEDICATION: ICD-10-CM

## 2024-02-28 DIAGNOSIS — L40.50 PSORIATIC ARTHRITIS (HCC): ICD-10-CM

## 2024-02-28 RX ORDER — LEFLUNOMIDE 20 MG/1
20 TABLET ORAL DAILY
Qty: 30 TABLET | Refills: 1 | OUTPATIENT
Start: 2024-02-28

## 2024-02-29 ENCOUNTER — HOSPITAL ENCOUNTER (OUTPATIENT)
Age: 59
Discharge: HOME | End: 2024-02-29
Payer: MEDICAID

## 2024-02-29 DIAGNOSIS — L40.50 PSORIATIC ARTHRITIS (HCC): ICD-10-CM

## 2024-02-29 DIAGNOSIS — Z79.899 ENCOUNTER FOR LONG-TERM (CURRENT) USE OF HIGH-RISK MEDICATION: ICD-10-CM

## 2024-02-29 LAB
ALBUMIN SERPL BCG-MCNC: 4.1 G/DL (ref 3.5–5.1)
ALP SERPL-CCNC: 86 U/L (ref 38–126)
ALT SERPL W/O P-5'-P-CCNC: 18 U/L (ref 11–66)
ANION GAP SERPL CALC-SCNC: 8 MEQ/L (ref 8–16)
AST SERPL-CCNC: 12 U/L (ref 5–40)
BASOPHILS ABSOLUTE: 0.1 THOU/MM3 (ref 0–0.1)
BASOPHILS NFR BLD AUTO: 1.1 %
BILIRUB SERPL-MCNC: 0.3 MG/DL (ref 0.3–1.2)
BUN SERPL-MCNC: 15 MG/DL (ref 7–22)
CALCIUM SERPL-MCNC: 10 MG/DL (ref 8.5–10.5)
CHLORIDE SERPL-SCNC: 104 MEQ/L (ref 98–111)
CO2 SERPL-SCNC: 30 MEQ/L (ref 23–33)
CREAT SERPL-MCNC: 1 MG/DL (ref 0.4–1.2)
CRP SERPL-MCNC: 0.66 MG/DL (ref 0–1)
DEPRECATED RDW RBC AUTO: 47.1 FL (ref 35–45)
EOSINOPHIL NFR BLD AUTO: 2.8 %
EOSINOPHILS ABSOLUTE: 0.2 THOU/MM3 (ref 0–0.4)
ERYTHROCYTE [DISTWIDTH] IN BLOOD BY AUTOMATED COUNT: 14.2 % (ref 11.5–14.5)
ERYTHROCYTE [SEDIMENTATION RATE] IN BLOOD BY WESTERGREN METHOD: 18 MM/HR (ref 0–20)
GFR SERPL CREATININE-BSD FRML MDRD: > 60 ML/MIN/1.73M2
GLUCOSE SERPL-MCNC: 85 MG/DL (ref 70–108)
HCT VFR BLD AUTO: 43.4 % (ref 37–47)
HGB BLD-MCNC: 14 GM/DL (ref 12–16)
IMM GRANULOCYTES # BLD AUTO: 0.02 THOU/MM3 (ref 0–0.07)
IMM GRANULOCYTES NFR BLD AUTO: 0.2 %
LYMPHOCYTES ABSOLUTE: 2.7 THOU/MM3 (ref 1–4.8)
LYMPHOCYTES NFR BLD AUTO: 33.2 %
MCH RBC QN AUTO: 29.2 PG (ref 26–33)
MCHC RBC AUTO-ENTMCNC: 32.3 GM/DL (ref 32.2–35.5)
MCV RBC AUTO: 90.6 FL (ref 81–99)
MONOCYTES ABSOLUTE: 0.5 THOU/MM3 (ref 0.4–1.3)
MONOCYTES NFR BLD AUTO: 5.8 %
NEUTROPHILS NFR BLD AUTO: 56.9 %
NRBC BLD AUTO-RTO: 0 /100 WBC
PLATELET # BLD AUTO: 323 THOU/MM3 (ref 130–400)
PMV BLD AUTO: 10.4 FL (ref 9.4–12.4)
POTASSIUM SERPL-SCNC: 3.6 MEQ/L (ref 3.5–5.2)
PROT SERPL-MCNC: 7 G/DL (ref 6.1–8)
RBC # BLD AUTO: 4.79 MILL/MM3 (ref 4.2–5.4)
SEGMENTED NEUTROPHILS ABSOLUTE COUNT: 4.6 THOU/MM3 (ref 1.8–7.7)
SODIUM SERPL-SCNC: 142 MEQ/L (ref 135–145)
WBC # BLD AUTO: 8.1 THOU/MM3 (ref 4.8–10.8)

## 2024-02-29 PROCEDURE — 36415 COLL VENOUS BLD VENIPUNCTURE: CPT

## 2024-02-29 PROCEDURE — 86140 C-REACTIVE PROTEIN: CPT

## 2024-02-29 PROCEDURE — 85025 COMPLETE CBC W/AUTO DIFF WBC: CPT

## 2024-02-29 PROCEDURE — 85651 RBC SED RATE NONAUTOMATED: CPT

## 2024-02-29 PROCEDURE — 80053 COMPREHEN METABOLIC PANEL: CPT

## 2024-03-03 DIAGNOSIS — L40.50 PSORIATIC ARTHRITIS (HCC): ICD-10-CM

## 2024-03-03 DIAGNOSIS — Z79.899 ENCOUNTER FOR LONG-TERM (CURRENT) USE OF HIGH-RISK MEDICATION: ICD-10-CM

## 2024-03-04 RX ORDER — LEFLUNOMIDE 20 MG/1
20 TABLET ORAL DAILY
Qty: 30 TABLET | Refills: 2 | Status: SHIPPED | OUTPATIENT
Start: 2024-03-04

## 2024-03-04 NOTE — TELEPHONE ENCOUNTER
DOLV: 1/4/24  DONV: 4/4/24  LAST LAB DRAW: 2/29/24  LAST TB TEST: n/a    Lab Results   Component Value Date     02/29/2024    K 3.6 02/29/2024     02/29/2024    CO2 30 02/29/2024    BUN 15 02/29/2024    CREATININE 1.0 02/29/2024    GLUCOSE 85 02/29/2024    CALCIUM 10.0 02/29/2024    PROT 7.0 02/29/2024    LABALBU 4.1 02/29/2024    BILITOT 0.3 02/29/2024    ALKPHOS 86 02/29/2024    AST 12 02/29/2024    ALT 18 02/29/2024    LABGLOM >60 02/29/2024       Recent Labs     02/29/24  1133   WBC 8.1   HGB 14.0   HCT 43.4   MCV 90.6          Lab Results   Component Value Date    SEDRATE 18 02/29/2024       Lab Results   Component Value Date    CRP 0.66 02/29/2024

## 2024-04-04 ENCOUNTER — OFFICE VISIT (OUTPATIENT)
Dept: RHEUMATOLOGY | Age: 59
End: 2024-04-04
Payer: MEDICAID

## 2024-04-04 VITALS
WEIGHT: 208.6 LBS | HEIGHT: 64 IN | HEART RATE: 59 BPM | BODY MASS INDEX: 35.61 KG/M2 | SYSTOLIC BLOOD PRESSURE: 112 MMHG | OXYGEN SATURATION: 95 % | DIASTOLIC BLOOD PRESSURE: 78 MMHG

## 2024-04-04 DIAGNOSIS — Z79.899 ENCOUNTER FOR LONG-TERM (CURRENT) USE OF HIGH-RISK MEDICATION: ICD-10-CM

## 2024-04-04 DIAGNOSIS — L40.50 PSORIATIC ARTHRITIS (HCC): Primary | ICD-10-CM

## 2024-04-04 DIAGNOSIS — M46.1 SACROILIITIS (HCC): ICD-10-CM

## 2024-04-04 PROCEDURE — 99214 OFFICE O/P EST MOD 30 MIN: CPT | Performed by: INTERNAL MEDICINE

## 2024-04-04 RX ORDER — BENZONATATE 200 MG/1
CAPSULE ORAL
COMMUNITY
Start: 2024-04-03

## 2024-04-04 RX ORDER — CEFDINIR 300 MG/1
CAPSULE ORAL
COMMUNITY
Start: 2024-04-03

## 2024-04-04 ASSESSMENT — ENCOUNTER SYMPTOMS
COUGH: 0
NAUSEA: 0
ABDOMINAL PAIN: 0
SHORTNESS OF BREATH: 0
VOMITING: 0

## 2024-04-04 NOTE — PROGRESS NOTES
[facility]   Rheumatology Clinic Note      4/4/2024         CHIEF COMPLAINT:    Chief Complaint   Patient presents with    3 Month Follow-Up     Psoriatic arthritis (HCC)    Joint Pain     Generalized pain, muscle spasms bilateral legs. Pain level 8           HISTORY OF PRESENT ILLNESS:    58 y.o. female with inflammatory arthritis (suspected psoriatic arthritis) presents for follow up. Presently, she is on leflunomide 20 mg daily.    In past week, reports having spasms in her legs. More joint pain in the past week. Was doing really well previously. Believes that the changes in weather has been contributing to this.    Decreased appetite since starting leflunomide. But is able to eat. No nausea or vomiting.    Pain in the back, hips, knees, elbows, wrists and hands.  Swelling in hands.  Stiffness in hands in the morning lasting for 30-60 minutes.    Numbness in hands.      RECAP:  H/o right shoulder surgery for rotator cuff tear. Has 6 screws in right shoulder.    Pain in the knees, lower back and right shoulder.    She is following with pain management. Had nerve block injections. Reports that it used to help, but not as much recently.    Pain is constant achy nonradiating.  Walking and getting up from seated position aggravates the pain.  Improves with rest.    Reports having sensation of knees locking when getting up from chair. Has to stand for a minute or two to get the knee loosened.     Has h/o skin psoriasis. Well controlled with topical agents.  Reports taking Taltz in the past for her skin and joint pain that was prescribed by her dermatologist. Taltz did not help her joint pain.   Was discontinue and no further biologic agents were prescribed.      Past Medical History:     has a past medical history of Osteoarthritis and Shingles.    Past Surgical History:     has a past surgical history that includes Rotator cuff repair (Right); Carpal tunnel release (Bilateral); Tubal ligation; Cataract

## 2024-04-10 ENCOUNTER — OFFICE VISIT (OUTPATIENT)
Dept: PHYSICAL MEDICINE AND REHAB | Age: 59
End: 2024-04-10
Payer: MEDICAID

## 2024-04-10 VITALS
WEIGHT: 208 LBS | BODY MASS INDEX: 35.51 KG/M2 | HEIGHT: 64 IN | SYSTOLIC BLOOD PRESSURE: 132 MMHG | DIASTOLIC BLOOD PRESSURE: 86 MMHG

## 2024-04-10 DIAGNOSIS — M79.18 MYOFASCIAL PAIN: ICD-10-CM

## 2024-04-10 DIAGNOSIS — M47.816 LUMBAR SPONDYLOSIS: Primary | ICD-10-CM

## 2024-04-10 DIAGNOSIS — M53.3 SACROILIAC JOINT DYSFUNCTION OF BOTH SIDES: ICD-10-CM

## 2024-04-10 DIAGNOSIS — G89.4 CHRONIC PAIN SYNDROME: ICD-10-CM

## 2024-04-10 DIAGNOSIS — R29.898 WEAKNESS OF BOTH LOWER EXTREMITIES: ICD-10-CM

## 2024-04-10 PROCEDURE — 99214 OFFICE O/P EST MOD 30 MIN: CPT | Performed by: NURSE PRACTITIONER

## 2024-04-10 RX ORDER — PREGABALIN 75 MG/1
CAPSULE ORAL
Qty: 90 CAPSULE | Refills: 0 | Status: SHIPPED | OUTPATIENT
Start: 2024-04-10 | End: 2024-05-10

## 2024-04-10 RX ORDER — MELOXICAM 15 MG/1
TABLET ORAL
Qty: 30 TABLET | Refills: 2 | Status: SHIPPED | OUTPATIENT
Start: 2024-04-10

## 2024-04-10 NOTE — PROGRESS NOTES
Functionality Assessment/Goals Worksheet     On a scale of 0 (Does not Interfere) to 10 (Completely Interferes)     1.  Which number describes how during the past week pain has interfered with           the following:  A.  General Activity:  7  B.  Mood: 3  C.  Walking Ability:  8  D.  Normal Work (Includes both work outside the home and housework):  6  E.  Relations with Other People:   2  F.  Sleep:   7  G.  Enjoyment of Life:   2    2.  Patient Prefers to Take their Pain Medications:     [x]  On a regular basis   []  Only when necessary    []  Does not take pain medications    3.  What are the Patient's Goals/Expectations for Visiting Pain Management?     [x]  Learn about my pain    []  Receive Medication   []  Physical Therapy     []  Treat Depression   []  Receive Injections    []  Treat Sleep   []  Deal with Anxiety and Stress   []  Treat Opoid Dependence/Addiction   []  Other:                                
(right>left), widespread joint pain  Neurological: negative for any leg weakness or numbness/tingling  Behavioral/Psych: negative for anxiety/depression   All other systems reviewed and are negative    Objective:     Vitals:    04/10/24 0746   BP: 132/86       Constitutional: Pleasant, no acute distress   Head: Normocephalic, atraumatic   Eyes: Conjunctivae normal   Neck: Supple, symmetrical   Lungs: Normal respiratory effort, non-labored breathing   Cardiovascular: Limbs warm and well perfused   Abdomen: Non-protruded   Musculoskeletal: Muscle bulk symmetric, no atrophy, no gross deformities   Upper extremities: Right arm: Forward flexion 90%, Abduction 90%. Positive lift-off test. Extreme tenderness with palpation of right shoulder, right trapezius, and right cervical paraspinals.   · Lower Extremities: Tenderness palpation over medial and lateral joint lines of right knee.  Appreciable crepitus with knee range of motion.  Ligamentous structures intact.  Lumbar: Tenderness with palpation.  Positive facet loading.  Equivocal straight leg test, bilaterally. Mild tenderness to right greater trochanter bursa. STEVIE positive bilaterally.  Positive FADIR, right.  GAENSLEN positive bilaterally.  Bilateral SI joints tender to palpation.  SI joint distraction positive bilaterally.  Neurological: Cranial nerves II-XII grossly intact.   · Gait - Antalgic gait. Ambulates without assistive device.   · Motor: No focal motor deficits appreciated lower limbs but appreciable giveaway weakness due to pain. Motor: 4/5 muscle strength in bilateral hip flexion, knee flexion, knee extension, ankle dorsiflexion, and ankle plantar flexion.    · Sensory: LT sensation intact in lower limbs   Reflexes: 2+ symmetrical in bilateral achilles, 2+ bilateral patellar, negative ankle clonus, downgoing babinski   Skin: No rashes or lesions present   Psychological: Cooperative, no exaggerated pain behaviors     Assessment:    Diagnosis Orders   1.

## 2024-05-08 ENCOUNTER — HOSPITAL ENCOUNTER (OUTPATIENT)
Age: 59
Discharge: HOME | End: 2024-05-08
Payer: MEDICAID

## 2024-05-08 ENCOUNTER — OFFICE VISIT (OUTPATIENT)
Dept: PHYSICAL MEDICINE AND REHAB | Age: 59
End: 2024-05-08
Payer: MEDICAID

## 2024-05-08 ENCOUNTER — HOSPITAL ENCOUNTER (OUTPATIENT)
Dept: GENERAL RADIOLOGY | Age: 59
Discharge: HOME | End: 2024-05-08
Payer: MEDICAID

## 2024-05-08 VITALS
BODY MASS INDEX: 35.51 KG/M2 | HEIGHT: 64 IN | DIASTOLIC BLOOD PRESSURE: 86 MMHG | WEIGHT: 208 LBS | SYSTOLIC BLOOD PRESSURE: 118 MMHG

## 2024-05-08 DIAGNOSIS — M53.3 SACROILIAC JOINT DYSFUNCTION OF BOTH SIDES: ICD-10-CM

## 2024-05-08 DIAGNOSIS — M79.2 NEUROPATHIC PAIN: ICD-10-CM

## 2024-05-08 DIAGNOSIS — G89.4 CHRONIC PAIN SYNDROME: ICD-10-CM

## 2024-05-08 DIAGNOSIS — M54.16 LUMBAR RADICULOPATHY: ICD-10-CM

## 2024-05-08 DIAGNOSIS — M25.552 LEFT HIP PAIN: ICD-10-CM

## 2024-05-08 DIAGNOSIS — M47.816 LUMBAR SPONDYLOSIS: ICD-10-CM

## 2024-05-08 DIAGNOSIS — M25.552 LEFT HIP PAIN: Primary | ICD-10-CM

## 2024-05-08 PROCEDURE — 99214 OFFICE O/P EST MOD 30 MIN: CPT | Performed by: NURSE PRACTITIONER

## 2024-05-08 PROCEDURE — 73502 X-RAY EXAM HIP UNI 2-3 VIEWS: CPT

## 2024-05-08 RX ORDER — BACLOFEN 10 MG/1
5-10 TABLET ORAL 3 TIMES DAILY
Qty: 45 TABLET | Refills: 0 | Status: SHIPPED | OUTPATIENT
Start: 2024-05-08

## 2024-05-08 RX ORDER — PREGABALIN 150 MG/1
150 CAPSULE ORAL 2 TIMES DAILY
Qty: 60 CAPSULE | Refills: 1 | Status: SHIPPED | OUTPATIENT
Start: 2024-05-08 | End: 2024-07-07

## 2024-05-08 NOTE — PROGRESS NOTES
Functionality Assessment/Goals Worksheet     On a scale of 0 (Does not Interfere) to 10 (Completely Interferes)     1.  Which number describes how during the past week pain has interfered with           the following:  A.  General Activity:  4  B.  Mood: 1  C.  Walking Ability:  5  D.  Normal Work (Includes both work outside the home and housework):  8  E.  Relations with Other People:   0  F.  Sleep:   9  G.  Enjoyment of Life:   0    2.  Patient Prefers to Take their Pain Medications:     [x]  On a regular basis   []  Only when necessary    []  Does not take pain medications    3.  What are the Patient's Goals/Expectations for Visiting Pain Management?     [x]  Learn about my pain    []  Receive Medication   []  Physical Therapy     []  Treat Depression   []  Receive Injections    []  Treat Sleep   []  Deal with Anxiety and Stress   []  Treat Opoid Dependence/Addiction   []  Other:                                
contributing to her left anterior thigh pain.  Her back MRI does not explain the severe right hip pain and what feels like popping and dislocation.  I have ordered a right hip x-ray for further investigation.  I did refer her to Dr. Truong for a bilateral lower extremity EMG for further investigation.  We discussed lumbar epidural steroid injection but she would like to hold off on this as she does feel that pain returns more severe after injections.   Last visit, I encouraged her to continue to work on weight loss efforts.  We discussed continuing her home exercises for stability of her back and hips with weight loss.  I have started her on baclofen 5 to 10 mg up to 3 times a day as needed.  We will follow-up in 4 weeks for medication management and to review her right hip x-ray.    Plan:   The following treatment recommendations and plan were discussed in detail with Arlene Wharton.    Imaging:   Lumbar MRI reviewed and discussed with the patient.     Analgesics:   Patient is taking Acetaminophen. Patient informed that the maximum amount of acetaminophen taken on a regular basis should only be 4000 mg per day.     I have continued her Mobic 15mg daily. Patient is advised to take as prescribed and not take on an empty stomach.     Adjuvants:   Increase Lyrica to 150 mg twice daily.    Interventions:   None     Multidisciplinary Pain Management:   In the presence of complex, chronic, and multi-factorial pain, the importance of a multidisciplinary approach to pain management in the patient’s management regimen was emphasized and discussed in great detail.   PHYSICAL THERAPY: Patient is advised to see a physical therapist for gentle stretching exercises and conditioning exercises for management of pain.     Referrals:  Right hip x-ray    Prescriptions Written This Visit:   Lyrica 150 mg (#60, 0 refills)  Baclofen 10 mg (#45, 0 refills)      Follow-up: 4 weeks    MARBIN Hebert - CNP  Interventional Pain

## 2024-05-26 DIAGNOSIS — L40.50 PSORIATIC ARTHRITIS (HCC): ICD-10-CM

## 2024-05-26 DIAGNOSIS — Z79.899 ENCOUNTER FOR LONG-TERM (CURRENT) USE OF HIGH-RISK MEDICATION: ICD-10-CM

## 2024-05-28 RX ORDER — LEFLUNOMIDE 20 MG/1
20 TABLET ORAL DAILY
Qty: 30 TABLET | Refills: 2 | OUTPATIENT
Start: 2024-05-28

## 2024-05-29 ENCOUNTER — HOSPITAL ENCOUNTER (OUTPATIENT)
Age: 59
Discharge: HOME | End: 2024-05-29
Payer: MEDICAID

## 2024-05-29 DIAGNOSIS — Z79.899 ENCOUNTER FOR LONG-TERM (CURRENT) USE OF HIGH-RISK MEDICATION: ICD-10-CM

## 2024-05-29 DIAGNOSIS — L40.50 PSORIATIC ARTHRITIS (HCC): ICD-10-CM

## 2024-05-29 LAB
ALBUMIN SERPL BCG-MCNC: 4 G/DL (ref 3.5–5.1)
ALP SERPL-CCNC: 85 U/L (ref 38–126)
ALT SERPL W/O P-5'-P-CCNC: 16 U/L (ref 11–66)
ANION GAP SERPL CALC-SCNC: 11 MEQ/L (ref 8–16)
AST SERPL-CCNC: 18 U/L (ref 5–40)
BASOPHILS ABSOLUTE: 0.1 THOU/MM3 (ref 0–0.1)
BASOPHILS NFR BLD AUTO: 1.4 %
BILIRUB SERPL-MCNC: 0.3 MG/DL (ref 0.3–1.2)
BUN SERPL-MCNC: 11 MG/DL (ref 7–22)
CALCIUM SERPL-MCNC: 9.3 MG/DL (ref 8.5–10.5)
CHLORIDE SERPL-SCNC: 109 MEQ/L (ref 98–111)
CO2 SERPL-SCNC: 23 MEQ/L (ref 23–33)
CREAT SERPL-MCNC: 0.9 MG/DL (ref 0.4–1.2)
CRP SERPL-MCNC: 0.38 MG/DL (ref 0–1)
DEPRECATED RDW RBC AUTO: 49.9 FL (ref 35–45)
EOSINOPHIL NFR BLD AUTO: 2.5 %
EOSINOPHILS ABSOLUTE: 0.2 THOU/MM3 (ref 0–0.4)
ERYTHROCYTE [DISTWIDTH] IN BLOOD BY AUTOMATED COUNT: 14.9 % (ref 11.5–14.5)
ERYTHROCYTE [SEDIMENTATION RATE] IN BLOOD BY WESTERGREN METHOD: 40 MM/HR (ref 0–20)
GFR SERPL CREATININE-BSD FRML MDRD: 73 ML/MIN/1.73M2
GLUCOSE SERPL-MCNC: 93 MG/DL (ref 70–108)
HCT VFR BLD AUTO: 39.2 % (ref 37–47)
HGB BLD-MCNC: 12.6 GM/DL (ref 12–16)
IMM GRANULOCYTES # BLD AUTO: 0.02 THOU/MM3 (ref 0–0.07)
IMM GRANULOCYTES NFR BLD AUTO: 0.3 %
LYMPHOCYTES ABSOLUTE: 2 THOU/MM3 (ref 1–4.8)
LYMPHOCYTES NFR BLD AUTO: 30 %
MCH RBC QN AUTO: 29.3 PG (ref 26–33)
MCHC RBC AUTO-ENTMCNC: 32.1 GM/DL (ref 32.2–35.5)
MCV RBC AUTO: 91.2 FL (ref 81–99)
MONOCYTES ABSOLUTE: 0.5 THOU/MM3 (ref 0.4–1.3)
MONOCYTES NFR BLD AUTO: 7.6 %
NEUTROPHILS ABSOLUTE: 3.8 THOU/MM3 (ref 1.8–7.7)
NEUTROPHILS NFR BLD AUTO: 58.2 %
NRBC BLD AUTO-RTO: 0 /100 WBC
PLATELET # BLD AUTO: 283 THOU/MM3 (ref 130–400)
PMV BLD AUTO: 11.6 FL (ref 9.4–12.4)
POTASSIUM SERPL-SCNC: 4.1 MEQ/L (ref 3.5–5.2)
PROT SERPL-MCNC: 6.7 G/DL (ref 6.1–8)
RBC # BLD AUTO: 4.3 MILL/MM3 (ref 4.2–5.4)
SODIUM SERPL-SCNC: 143 MEQ/L (ref 135–145)
WBC # BLD AUTO: 6.5 THOU/MM3 (ref 4.8–10.8)

## 2024-05-29 PROCEDURE — 80053 COMPREHEN METABOLIC PANEL: CPT

## 2024-05-29 PROCEDURE — 85651 RBC SED RATE NONAUTOMATED: CPT

## 2024-05-29 PROCEDURE — 86140 C-REACTIVE PROTEIN: CPT

## 2024-05-29 PROCEDURE — 85025 COMPLETE CBC W/AUTO DIFF WBC: CPT

## 2024-05-29 PROCEDURE — 36415 COLL VENOUS BLD VENIPUNCTURE: CPT

## 2024-06-03 DIAGNOSIS — Z79.899 ENCOUNTER FOR LONG-TERM (CURRENT) USE OF HIGH-RISK MEDICATION: ICD-10-CM

## 2024-06-03 DIAGNOSIS — L40.50 PSORIATIC ARTHRITIS (HCC): ICD-10-CM

## 2024-06-03 RX ORDER — LEFLUNOMIDE 20 MG/1
20 TABLET ORAL DAILY
Qty: 30 TABLET | Refills: 2 | Status: SHIPPED | OUTPATIENT
Start: 2024-06-03

## 2024-06-03 NOTE — TELEPHONE ENCOUNTER
DOLV: 4/4/24  DONV: 8/5/24  LAST LAB DRAW: 5/29/24  LAST TB TEST: n/a    Lab Results   Component Value Date     05/29/2024    K 4.1 05/29/2024     05/29/2024    CO2 23 05/29/2024    BUN 11 05/29/2024    CREATININE 0.9 05/29/2024    GLUCOSE 93 05/29/2024    CALCIUM 9.3 05/29/2024    BILITOT 0.3 05/29/2024    ALKPHOS 85 05/29/2024    AST 18 05/29/2024    ALT 16 05/29/2024    LABGLOM 73 05/29/2024       Recent Labs     05/29/24  1418   WBC 6.5   HGB 12.6   HCT 39.2   MCV 91.2          Lab Results   Component Value Date    SEDRATE 40 (H) 05/29/2024       Lab Results   Component Value Date    CRP 0.38 05/29/2024

## 2024-06-05 ENCOUNTER — OFFICE VISIT (OUTPATIENT)
Dept: PHYSICAL MEDICINE AND REHAB | Age: 59
End: 2024-06-05
Payer: MEDICAID

## 2024-06-05 VITALS
HEIGHT: 64 IN | SYSTOLIC BLOOD PRESSURE: 126 MMHG | WEIGHT: 208 LBS | BODY MASS INDEX: 35.51 KG/M2 | DIASTOLIC BLOOD PRESSURE: 86 MMHG

## 2024-06-05 DIAGNOSIS — M25.551 RIGHT HIP PAIN: Primary | ICD-10-CM

## 2024-06-05 DIAGNOSIS — R29.898 WEAKNESS OF BOTH LOWER EXTREMITIES: ICD-10-CM

## 2024-06-05 DIAGNOSIS — M47.816 LUMBAR SPONDYLOSIS: ICD-10-CM

## 2024-06-05 DIAGNOSIS — M53.3 SACROILIAC JOINT DYSFUNCTION OF BOTH SIDES: ICD-10-CM

## 2024-06-05 DIAGNOSIS — G89.4 CHRONIC PAIN SYNDROME: ICD-10-CM

## 2024-06-05 DIAGNOSIS — M25.552 LEFT HIP PAIN: ICD-10-CM

## 2024-06-05 PROCEDURE — 99214 OFFICE O/P EST MOD 30 MIN: CPT | Performed by: NURSE PRACTITIONER

## 2024-06-05 NOTE — PROGRESS NOTES
Functionality Assessment/Goals Worksheet     On a scale of 0 (Does not Interfere) to 10 (Completely Interferes)     1.  Which number describes how during the past week pain has interfered with           the following:  A.  General Activity:  4  B.  Mood: 1  C.  Walking Ability:  8  D.  Normal Work (Includes both work outside the home and housework):  9  E.  Relations with Other People:   0  F.  Sleep:   7  G.  Enjoyment of Life:   0    2.  Patient Prefers to Take their Pain Medications:     [x]  On a regular basis   []  Only when necessary    []  Does not take pain medications    3.  What are the Patient's Goals/Expectations for Visiting Pain Management?     [x]  Learn about my pain    []  Receive Medication   []  Physical Therapy     []  Treat Depression   []  Receive Injections    []  Treat Sleep   []  Deal with Anxiety and Stress   []  Treat Opoid Dependence/Addiction   []  Other:                                
nerves II-XII grossly intact.   · Gait - Antalgic gait. Ambulates without assistive device.   · Motor: No focal motor deficits appreciated lower limbs but appreciable giveaway weakness due to pain. Motor: 4/5 muscle strength in bilateral hip flexion, knee flexion, knee extension, ankle dorsiflexion, and ankle plantar flexion.    · Sensory: LT sensation intact in lower limbs   Reflexes: 2+ symmetrical in bilateral achilles, 2+ bilateral patellar, negative ankle clonus, downgoing babinski   Skin: No rashes or lesions present   Psychological: Cooperative, no exaggerated pain behaviors     Assessment:    Diagnosis Orders   1. Right hip pain        2. Left hip pain        3. Lumbar spondylosis        4. Sacroiliac joint dysfunction of both sides        5. Chronic pain syndrome        6. Weakness of both lower extremities              Arlene Wharton is a 59 y.o.female presenting to the pain clinic for evaluation of chronic pain in several joints.  She has responded well to previous SI joint injections and lumbar facet steroid injections. She continues to have a lot of pain in multiple joints.  I have continued her Mobic 15 mg daily and Lyrica to 150 mg twice daily.  Her leg pain is very likely related to the stenosis in her back, most significant at L4-5.  She also has stenosis at left L2-3 and L3-4 contributing to her left anterior thigh pain.  Her left leg pain is overall well-controlled, she more complains of leg weakness.  Her back MRI does not explain the severe right hip pain and what feels like popping and dislocation.  Her right hip x-ray is within normal limits.   Last visit, I encouraged her to continue to work on weight loss efforts.  I have referred her to physical therapy for her hip pain and leg weakness.  We discussed the importance of continuing exercises at home.  She states she does have a folder from physical therapy in the past that she uses daily.  We discussed potentially pursuing Norflex twice daily

## 2024-07-01 RX ORDER — MELOXICAM 15 MG/1
TABLET ORAL
Qty: 30 TABLET | Refills: 2 | Status: SHIPPED | OUTPATIENT
Start: 2024-07-01

## 2024-08-05 ENCOUNTER — OFFICE VISIT (OUTPATIENT)
Dept: RHEUMATOLOGY | Age: 59
End: 2024-08-05
Payer: MEDICAID

## 2024-08-05 ENCOUNTER — HOSPITAL ENCOUNTER (OUTPATIENT)
Age: 59
Discharge: HOME | End: 2024-08-05
Payer: MEDICAID

## 2024-08-05 VITALS
OXYGEN SATURATION: 97 % | DIASTOLIC BLOOD PRESSURE: 78 MMHG | SYSTOLIC BLOOD PRESSURE: 118 MMHG | WEIGHT: 195 LBS | HEART RATE: 59 BPM | BODY MASS INDEX: 33.29 KG/M2 | HEIGHT: 64 IN

## 2024-08-05 DIAGNOSIS — Z79.899 ENCOUNTER FOR LONG-TERM (CURRENT) USE OF HIGH-RISK MEDICATION: ICD-10-CM

## 2024-08-05 DIAGNOSIS — L40.50 PSORIATIC ARTHRITIS (HCC): ICD-10-CM

## 2024-08-05 DIAGNOSIS — R53.83 FATIGUE, UNSPECIFIED TYPE: ICD-10-CM

## 2024-08-05 DIAGNOSIS — Z11.59 NEED FOR HEPATITIS B SCREENING TEST: ICD-10-CM

## 2024-08-05 DIAGNOSIS — Z11.1 SCREENING-PULMONARY TB: ICD-10-CM

## 2024-08-05 DIAGNOSIS — Z11.59 NEED FOR HEPATITIS B SCREENING TEST: Primary | ICD-10-CM

## 2024-08-05 LAB
ALBUMIN SERPL BCG-MCNC: 4.4 G/DL (ref 3.5–5.1)
ALP SERPL-CCNC: 98 U/L (ref 38–126)
ALT SERPL W/O P-5'-P-CCNC: 13 U/L (ref 11–66)
ANION GAP SERPL CALC-SCNC: 10 MEQ/L (ref 8–16)
AST SERPL-CCNC: 13 U/L (ref 5–40)
BASOPHILS ABSOLUTE: 0.1 THOU/MM3 (ref 0–0.1)
BASOPHILS NFR BLD AUTO: 1.8 %
BILIRUB SERPL-MCNC: 0.4 MG/DL (ref 0.3–1.2)
BUN SERPL-MCNC: 13 MG/DL (ref 7–22)
CALCIUM SERPL-MCNC: 9.3 MG/DL (ref 8.5–10.5)
CHLORIDE SERPL-SCNC: 108 MEQ/L (ref 98–111)
CO2 SERPL-SCNC: 24 MEQ/L (ref 23–33)
CREAT SERPL-MCNC: 0.8 MG/DL (ref 0.4–1.2)
CRP SERPL-MCNC: 0.62 MG/DL (ref 0–1)
DEPRECATED RDW RBC AUTO: 49.5 FL (ref 35–45)
EOSINOPHIL NFR BLD AUTO: 2.5 %
EOSINOPHILS ABSOLUTE: 0.2 THOU/MM3 (ref 0–0.4)
ERYTHROCYTE [DISTWIDTH] IN BLOOD BY AUTOMATED COUNT: 14.7 % (ref 11.5–14.5)
ERYTHROCYTE [SEDIMENTATION RATE] IN BLOOD BY WESTERGREN METHOD: 38 MM/HR (ref 0–20)
GFR SERPL CREATININE-BSD FRML MDRD: 84 ML/MIN/1.73M2
GLUCOSE SERPL-MCNC: 98 MG/DL (ref 70–108)
HAV IGM SER QL: NEGATIVE
HBV CORE IGM SERPL QL IA: NEGATIVE
HBV SURFACE AG SERPL QL IA: NEGATIVE
HCT VFR BLD AUTO: 40.9 % (ref 37–47)
HCV IGG SERPL QL IA: NEGATIVE
HGB BLD-MCNC: 13.1 GM/DL (ref 12–16)
IMM GRANULOCYTES # BLD AUTO: 0.02 THOU/MM3 (ref 0–0.07)
IMM GRANULOCYTES NFR BLD AUTO: 0.3 %
LYMPHOCYTES ABSOLUTE: 2 THOU/MM3 (ref 1–4.8)
LYMPHOCYTES NFR BLD AUTO: 30.4 %
MCH RBC QN AUTO: 29.2 PG (ref 26–33)
MCHC RBC AUTO-ENTMCNC: 32 GM/DL (ref 32.2–35.5)
MCV RBC AUTO: 91.3 FL (ref 81–99)
MONOCYTES ABSOLUTE: 0.6 THOU/MM3 (ref 0.4–1.3)
MONOCYTES NFR BLD AUTO: 8.7 %
NEUTROPHILS ABSOLUTE: 3.8 THOU/MM3 (ref 1.8–7.7)
NEUTROPHILS NFR BLD AUTO: 56.3 %
NRBC BLD AUTO-RTO: 0 /100 WBC
PLATELET # BLD AUTO: 378 THOU/MM3 (ref 130–400)
PMV BLD AUTO: 10.6 FL (ref 9.4–12.4)
POTASSIUM SERPL-SCNC: 3.9 MEQ/L (ref 3.5–5.2)
PROT SERPL-MCNC: 7.6 G/DL (ref 6.1–8)
RBC # BLD AUTO: 4.48 MILL/MM3 (ref 4.2–5.4)
SODIUM SERPL-SCNC: 142 MEQ/L (ref 135–145)
WBC # BLD AUTO: 6.7 THOU/MM3 (ref 4.8–10.8)

## 2024-08-05 PROCEDURE — 99214 OFFICE O/P EST MOD 30 MIN: CPT | Performed by: INTERNAL MEDICINE

## 2024-08-05 PROCEDURE — 85651 RBC SED RATE NONAUTOMATED: CPT

## 2024-08-05 PROCEDURE — 80074 ACUTE HEPATITIS PANEL: CPT

## 2024-08-05 PROCEDURE — 80053 COMPREHEN METABOLIC PANEL: CPT

## 2024-08-05 PROCEDURE — 36415 COLL VENOUS BLD VENIPUNCTURE: CPT

## 2024-08-05 PROCEDURE — 86140 C-REACTIVE PROTEIN: CPT

## 2024-08-05 PROCEDURE — 86480 TB TEST CELL IMMUN MEASURE: CPT

## 2024-08-05 PROCEDURE — 85025 COMPLETE CBC W/AUTO DIFF WBC: CPT

## 2024-08-05 ASSESSMENT — ENCOUNTER SYMPTOMS
COUGH: 0
VOMITING: 0
SHORTNESS OF BREATH: 0
ABDOMINAL PAIN: 0
NAUSEA: 0

## 2024-08-05 NOTE — PROGRESS NOTES
[facility]   Rheumatology Clinic Note      8/5/2024         CHIEF COMPLAINT:    Chief Complaint   Patient presents with    Follow-up     4 month Psoriatic arthritis (HCC)    Joint Pain     Lower limb pain   Would like to discuss bone growth. Patient stated she has extra bone growth on her spine and would like to know if this could be causing her pain.  Pain level 6.   No longer taking pregablin           HISTORY OF PRESENT ILLNESS:    59 y.o. female with inflammatory arthritis (suspected psoriatic arthritis) presents for follow up. Presently, she is on leflunomide 20 mg daily.  Past med: Taltz (was managed by dermatology for psoriasis)    Worsening pain in her tailbone. Associated with prolonged morning stiffness that lasts for 30 minutes.  Pain sometimes radiates to hips laterally.  Also has pain in her knees.      RECAP:  H/o right shoulder surgery for rotator cuff tear. Has 6 screws in right shoulder.    Pain in the knees, lower back and right shoulder.    She is following with pain management. Had nerve block injections. Reports that it used to help, but not as much recently.    Pain is constant achy nonradiating.  Walking and getting up from seated position aggravates the pain.  Improves with rest.    Reports having sensation of knees locking when getting up from chair. Has to stand for a minute or two to get the knee loosened.     Has h/o skin psoriasis. Well controlled with topical agents.  Reports taking Taltz in the past for her skin and joint pain that was prescribed by her dermatologist. Taltz did not help her joint pain.   Was discontinue and no further biologic agents were prescribed.      Past Medical History:     has a past medical history of Osteoarthritis and Shingles.    Past Surgical History:     has a past surgical history that includes Rotator cuff repair (Right); Carpal tunnel release (Bilateral); Tubal ligation; Cataract removal (Left); Back Injection (Bilateral, 3/1/2022); hip

## 2024-08-08 LAB
GAMMA INTERFERON BACKGROUND BLD IA-ACNC: 0.07 IU/ML
M TB IFN-G BLD-IMP: NEGATIVE
M TB IFN-G CD4+ BCKGRND COR BLD-ACNC: 0 IU/ML
M TB IFN-G CD4+CD8+ BCKGRND COR BLD-ACNC: 0 IU/ML
MITOGEN IGNF BCKGRD COR BLD-ACNC: 9.93 IU/ML

## 2024-08-16 DIAGNOSIS — L40.50 PSORIATIC ARTHRITIS (HCC): Primary | ICD-10-CM

## 2024-08-16 RX ORDER — ADALIMUMAB 40MG/0.4ML
40 KIT SUBCUTANEOUS
Qty: 2 EACH | Refills: 5 | Status: ACTIVE | OUTPATIENT
Start: 2024-08-16

## 2024-08-28 ENCOUNTER — OFFICE VISIT (OUTPATIENT)
Dept: PHYSICAL MEDICINE AND REHAB | Age: 59
End: 2024-08-28
Payer: MEDICAID

## 2024-08-28 VITALS — SYSTOLIC BLOOD PRESSURE: 156 MMHG | DIASTOLIC BLOOD PRESSURE: 100 MMHG

## 2024-08-28 DIAGNOSIS — G89.4 CHRONIC PAIN SYNDROME: ICD-10-CM

## 2024-08-28 DIAGNOSIS — M47.816 LUMBAR SPONDYLOSIS: ICD-10-CM

## 2024-08-28 DIAGNOSIS — M25.552 LEFT HIP PAIN: ICD-10-CM

## 2024-08-28 DIAGNOSIS — L40.50 PSORIATIC ARTHRITIS (HCC): ICD-10-CM

## 2024-08-28 DIAGNOSIS — M15.9 OSTEOARTHRITIS OF MULTIPLE JOINTS, UNSPECIFIED OSTEOARTHRITIS TYPE: ICD-10-CM

## 2024-08-28 DIAGNOSIS — Z79.899 ENCOUNTER FOR LONG-TERM (CURRENT) USE OF HIGH-RISK MEDICATION: ICD-10-CM

## 2024-08-28 DIAGNOSIS — M53.3 SACROILIAC JOINT DYSFUNCTION OF BOTH SIDES: ICD-10-CM

## 2024-08-28 DIAGNOSIS — M25.551 RIGHT HIP PAIN: Primary | ICD-10-CM

## 2024-08-28 PROCEDURE — 99214 OFFICE O/P EST MOD 30 MIN: CPT | Performed by: NURSE PRACTITIONER

## 2024-08-28 RX ORDER — LEFLUNOMIDE 20 MG/1
20 TABLET ORAL DAILY
Qty: 30 TABLET | Refills: 2 | Status: SHIPPED | OUTPATIENT
Start: 2024-08-28

## 2024-08-28 NOTE — TELEPHONE ENCOUNTER
DOLV: 8/5/24  DONV: 11/5/24  LAST LAB DRAW: 8/5/24  LAST TB TEST: 8/5/24    Lab Results   Component Value Date     08/05/2024    K 3.9 08/05/2024     08/05/2024    CO2 24 08/05/2024    BUN 13 08/05/2024    CREATININE 0.8 08/05/2024    GLUCOSE 98 08/05/2024    CALCIUM 9.3 08/05/2024    BILITOT 0.4 08/05/2024    ALKPHOS 98 08/05/2024    AST 13 08/05/2024    ALT 13 08/05/2024    LABGLOM 84 08/05/2024       Recent Labs     08/05/24  1112   WBC 6.7   HGB 13.1   HCT 40.9   MCV 91.3          Lab Results   Component Value Date    SEDRATE 38 (H) 08/05/2024       Lab Results   Component Value Date    CRP 0.62 08/05/2024

## 2024-08-28 NOTE — PROGRESS NOTES
Functionality Assessment/Goals Worksheet     On a scale of 0 (Does not Interfere) to 10 (Completely Interferes)     1.  Which number describes how during the past week pain has interfered with           the following:  A.  General Activity:  5  B.  Mood: 2  C.  Walking Ability:  7  D.  Normal Work (Includes both work outside the home and housework):  6  E.  Relations with Other People:   0  F.  Sleep:   6  G.  Enjoyment of Life:   1    2.  Patient Prefers to Take their Pain Medications:     [x]  On a regular basis   []  Only when necessary    []  Does not take pain medications    3.  What are the Patient's Goals/Expectations for Visiting Pain Management?     [x]  Learn about my pain    []  Receive Medication   []  Physical Therapy     []  Treat Depression   []  Receive Injections    []  Treat Sleep   []  Deal with Anxiety and Stress   []  Treat Opoid Dependence/Addiction   []  Other:

## 2024-08-28 NOTE — PROGRESS NOTES
Chronic Pain/PM&R Clinic Note     Encounter Date: 8/4/22    Subjective:   Chief Complaint:   Chief Complaint   Patient presents with    Follow-up     12 week fu       History of Present Illness:   Arlene Wharton is a 59 y.o. female seen in the clinic initially on 02/09/2022 upon request from Tre Tapia DO for her history of bilateral knee, right shoulder and left elbow pain.  Patient states her biggest pain generator at this time is her bilateral hips.  Patient states she has had hip pain for over 10 years.  She initially started seeing Dr. Doe at [facility] in 2012 or 2013 for this issue.  Patient states at that time she did have an MRI of her bilateral hips but she does not recall the results.  She was never told that she had any significant findings.  Patient states she currently uses a back brace to assist with her low back pain.  She will only wear this when she is out and about or driving.  Patient states last time she did physical therapy for her hips was around 2016.  She states this was not beneficial.  Patient denies any recent imaging of her hips.  Patient states she does have a history of degenerative disc disease.  Patient states pain is aggravated with activity, standing, sitting.  She states her right hip is significantly worse than her left.  She cannot lay on her right side.  Patient states she is not sleeping well due to pain.  Patient states she takes Tylenol and Aleve which do help take the edge off.  Patient states when she is in significant pain she has to sit down and rest.  He also provides minimal relief, while ice is ineffective.  Patient denies use of an assistive device.  She denies history of falls.  Patient does state that she noticed that she limps when she walks, especially after increased activity.  Patient states she is currently living at home with her boyfriend.  Patient states she is not currently working as she is trying to get disability.  Patient used to work in the lawn  pain  MRI PELVIS WO CONTRAST      2. Left hip pain  MRI PELVIS WO CONTRAST      3. Sacroiliac joint dysfunction of both sides  MRI PELVIS WO CONTRAST      4. Lumbar spondylosis  MRI PELVIS WO CONTRAST      5. Chronic pain syndrome  MRI PELVIS WO CONTRAST      6. Osteoarthritis of multiple joints, unspecified osteoarthritis type  MRI PELVIS WO CONTRAST          Arlene Wharton is a 59 y.o.female presenting to the pain clinic for evaluation of chronic pain in several joints.  She has responded well to previous SI joint injections and lumbar facet steroid injections. She continues to have a lot of pain in multiple joints.  I have continued her Mobic 15 mg daily.  Her leg pain is very likely related to the stenosis in her back, most significant at L4-5.  She also has stenosis at left L2-3 and L3-4 contributing to her left anterior thigh pain.  Her left leg pain is overall well-controlled, she more complains of leg weakness.  Her back lumbar MRI does not explain the severe right hip pain and what feels like popping and dislocation.  Her right hip x-ray is within normal limits.   Last visit, I encouraged her to continue to work on weight loss efforts.  She failed to respond to physical therapy regards to her low back and hip pain.  We discussed the importance of continuing exercises at home, which she has been doing.  She states she does have a folder from physical therapy in the past that she uses daily.  We discussed potentially pursuing Norflex but will hold off due to starting Humira this week.  She does not want take any more medication and would like to hold off on injections at this time.  I have ordered a pelvic MRI for further investigation of her low back and hip pain.  I did mention potentially repeating the SI injections pending her MRI, which may be considered.    Plan:   The following treatment recommendations and plan were discussed in detail with Arlene Wharton.    Imaging:   Lumbar MRI and right hip x-ray

## 2024-09-26 ENCOUNTER — HOSPITAL ENCOUNTER (OUTPATIENT)
Dept: MRI IMAGING | Age: 59
Discharge: HOME | End: 2024-09-26
Payer: MEDICAID

## 2024-09-26 DIAGNOSIS — M15.9 OSTEOARTHRITIS OF MULTIPLE JOINTS, UNSPECIFIED OSTEOARTHRITIS TYPE: ICD-10-CM

## 2024-09-26 DIAGNOSIS — G89.4 CHRONIC PAIN SYNDROME: ICD-10-CM

## 2024-09-26 DIAGNOSIS — M53.3 SACROILIAC JOINT DYSFUNCTION OF BOTH SIDES: ICD-10-CM

## 2024-09-26 DIAGNOSIS — M25.552 LEFT HIP PAIN: ICD-10-CM

## 2024-09-26 DIAGNOSIS — M25.551 RIGHT HIP PAIN: ICD-10-CM

## 2024-09-26 DIAGNOSIS — M47.816 LUMBAR SPONDYLOSIS: ICD-10-CM

## 2024-09-26 PROCEDURE — 72195 MRI PELVIS W/O DYE: CPT

## 2024-10-11 RX ORDER — MELOXICAM 15 MG/1
TABLET ORAL
Qty: 30 TABLET | Refills: 2 | Status: SHIPPED | OUTPATIENT
Start: 2024-10-11 | End: 2024-11-10

## 2024-11-05 ENCOUNTER — OFFICE VISIT (OUTPATIENT)
Dept: RHEUMATOLOGY | Age: 59
End: 2024-11-05
Payer: MEDICAID

## 2024-11-05 ENCOUNTER — TELEPHONE (OUTPATIENT)
Dept: RHEUMATOLOGY | Age: 59
End: 2024-11-05

## 2024-11-05 VITALS
OXYGEN SATURATION: 93 % | BODY MASS INDEX: 32.33 KG/M2 | HEART RATE: 72 BPM | WEIGHT: 189.4 LBS | DIASTOLIC BLOOD PRESSURE: 80 MMHG | HEIGHT: 64 IN | SYSTOLIC BLOOD PRESSURE: 130 MMHG

## 2024-11-05 DIAGNOSIS — L40.0 PLAQUE PSORIASIS: ICD-10-CM

## 2024-11-05 DIAGNOSIS — L40.50 PSORIATIC ARTHRITIS (HCC): Primary | ICD-10-CM

## 2024-11-05 DIAGNOSIS — Z79.899 ENCOUNTER FOR LONG-TERM (CURRENT) USE OF HIGH-RISK MEDICATION: ICD-10-CM

## 2024-11-05 PROCEDURE — 99214 OFFICE O/P EST MOD 30 MIN: CPT | Performed by: INTERNAL MEDICINE

## 2024-11-05 RX ORDER — LEFLUNOMIDE 20 MG/1
20 TABLET ORAL DAILY
Qty: 30 TABLET | Refills: 2 | Status: SHIPPED | OUTPATIENT
Start: 2024-11-05

## 2024-11-05 ASSESSMENT — ENCOUNTER SYMPTOMS
VOMITING: 0
COUGH: 0
NAUSEA: 0
ABDOMINAL PAIN: 0
SHORTNESS OF BREATH: 0

## 2024-11-05 NOTE — PROGRESS NOTES
[facility]   Rheumatology Clinic Note      11/5/2024         CHIEF COMPLAINT:    Chief Complaint   Patient presents with    3 Month Follow-Up     Psoriatic arthritis (HCC)    Joint Pain     Generalized pain   Pain level 5           HISTORY OF PRESENT ILLNESS:    59 y.o. female with psoriatic arthritis and psoriasis presents for follow up. Presently, she is on Humira 40 mg SQ every 2 weeks (started end of Sept 2024) and leflunomide 20 mg daily.  Past med: Taltz (was managed by dermatology for psoriasis)    Reports improvement in joint mobility, morning stiffness and joint swelling since starting Humira. Morning stiffness now is lasting for few minutes.  Pain is most pronounced in lower back and radiates to the left lower extremity. Reports having EMG/NCS at [facility] that suggested nerve damage on the left side. She is following with pain management.     Initial consultation  3/20/2023:  H/o right shoulder surgery for rotator cuff tear. Has 6 screws in right shoulder.    Pain in the knees, lower back and right shoulder.  She is following with pain management. Had nerve block injections. Reports that it used to help, but not as much recently.    Pain is constant achy nonradiating.  Walking and getting up from seated position aggravates the pain.  Improves with rest.    Reports having sensation of knees locking when getting up from chair. Has to stand for a minute or two to get the knee loosened.     Has h/o skin psoriasis. Well controlled with topical agents.  Reports taking Taltz in the past for her skin and joint pain that was prescribed by her dermatologist. Taltz did not help her joint pain.   Was discontinue and no further biologic agents were prescribed.      Past Medical History:     has a past medical history of Osteoarthritis and Shingles.    Past Surgical History:     has a past surgical history that includes Rotator cuff repair (Right); Carpal tunnel release (Bilateral); Tubal ligation; Cataract

## 2024-11-05 NOTE — TELEPHONE ENCOUNTER
Left detailed message for the patient to have labs completed. She was advised if she needs the orders faxed to contact the office.

## 2024-11-07 ENCOUNTER — OFFICE VISIT (OUTPATIENT)
Dept: PHYSICAL MEDICINE AND REHAB | Age: 59
End: 2024-11-07
Payer: MEDICAID

## 2024-11-07 VITALS
SYSTOLIC BLOOD PRESSURE: 126 MMHG | WEIGHT: 189 LBS | HEIGHT: 64 IN | DIASTOLIC BLOOD PRESSURE: 84 MMHG | BODY MASS INDEX: 32.27 KG/M2

## 2024-11-07 DIAGNOSIS — M70.61 GREATER TROCHANTERIC BURSITIS OF RIGHT HIP: Primary | ICD-10-CM

## 2024-11-07 DIAGNOSIS — M53.3 SACROILIAC JOINT DYSFUNCTION OF BOTH SIDES: ICD-10-CM

## 2024-11-07 DIAGNOSIS — M25.551 RIGHT HIP PAIN: ICD-10-CM

## 2024-11-07 DIAGNOSIS — M47.816 LUMBAR SPONDYLOSIS: ICD-10-CM

## 2024-11-07 DIAGNOSIS — G89.4 CHRONIC PAIN SYNDROME: ICD-10-CM

## 2024-11-07 PROCEDURE — 99214 OFFICE O/P EST MOD 30 MIN: CPT | Performed by: NURSE PRACTITIONER

## 2024-11-07 NOTE — PROGRESS NOTES
Functionality Assessment/Goals Worksheet     On a scale of 0 (Does not Interfere) to 10 (Completely Interferes)     1.  Which number describes how during the past week pain has interfered with           the following:  A.  General Activity:  4  B.  Mood: 1  C.  Walking Ability:  4  D.  Normal Work (Includes both work outside the home and housework):  4  E.  Relations with Other People:   0  F.  Sleep:   6  G.  Enjoyment of Life:   1    2.  Patient Prefers to Take their Pain Medications:     [x]  On a regular basis   []  Only when necessary    []  Does not take pain medications    3.  What are the Patient's Goals/Expectations for Visiting Pain Management?     [x]  Learn about my pain    []  Receive Medication   []  Physical Therapy     []  Treat Depression   []  Receive Injections    []  Treat Sleep   []  Deal with Anxiety and Stress   []  Treat Opoid Dependence/Addiction   []  Other:                                
in her back/buttocks.  She states the pain in her legs has overall been controlled without taking the Lyrica.  She continues to take the Mobic, Tylenol, medical marijuana.  She states Dr. Castillo is starting her on Humira and this will likely be started tomorrow.  She states rheumatology recommended she have a pelvic MRI for further investigation of her low back and hip pain.  She denies any new symptoms at this time.    Today, 11/7/2024, patient presents for planned follow-up on chronic pain.  She would like to review her pelvic MRI.  She also had her EMG with Dr. Truong and would like to review those results. Dr. Truong told her she has nerve damage from her back.  She continues to have a lot of pain in her right hip, mainly in the lateral aspect of her hip but this will also radiate anteriorly and posteriorly.  She states all of her joints in her body seem to be popping more, this is not necessarily come with aggravated pain.  She does feel that she has had more range of motion since starting Humira.  She continues to work with rheumatology.  She would like to review the EMG before determining next steps.  She denies any new symptoms at this time.    History of Interventions:   Surgery: No previous lumbar or hip surgeries  Right shoulder tendon and rotator cuff repair - hardware in place.   Injections: B/L SI joint injection (03/01/2022, 05/09/2023) - >85% relief  Cervical TPI (05/02/2022) - significant relief  R knee Synvisc injection (08/30/2022) - significant relief   Lumbar facet steroid injection L4/5 and L5/S1 (01/10/2023) - significant relief    Current Treatment Medications:   Tylenol - takes the edge off  Mobic 15 mg - effective  THC- dulls her pain    Historical Treatment Medications:   Diclofenac - effective at that time  Tramadol - effective at that time  Alleve - takes the edge off  Tizanidine 4 mg TID - ineffective  Flexeril - ineffective   Baclofen - s/e dry mouth, double vision  Lyrica 150 mg twice

## 2024-11-12 ENCOUNTER — HOSPITAL ENCOUNTER (OUTPATIENT)
Age: 59
Discharge: HOME | End: 2024-11-12
Payer: MEDICAID

## 2024-11-12 ENCOUNTER — OFFICE VISIT (OUTPATIENT)
Dept: PHYSICAL MEDICINE AND REHAB | Age: 59
End: 2024-11-12
Payer: MEDICAID

## 2024-11-12 VITALS
BODY MASS INDEX: 32.27 KG/M2 | SYSTOLIC BLOOD PRESSURE: 122 MMHG | HEIGHT: 64 IN | WEIGHT: 189 LBS | DIASTOLIC BLOOD PRESSURE: 80 MMHG

## 2024-11-12 DIAGNOSIS — M70.61 GREATER TROCHANTERIC BURSITIS OF RIGHT HIP: Primary | ICD-10-CM

## 2024-11-12 DIAGNOSIS — Z79.899 ENCOUNTER FOR LONG-TERM (CURRENT) USE OF HIGH-RISK MEDICATION: Primary | ICD-10-CM

## 2024-11-12 DIAGNOSIS — Z79.899 ENCOUNTER FOR LONG-TERM (CURRENT) USE OF HIGH-RISK MEDICATION: ICD-10-CM

## 2024-11-12 DIAGNOSIS — M47.816 LUMBAR SPONDYLOSIS: ICD-10-CM

## 2024-11-12 DIAGNOSIS — M54.16 LUMBAR RADICULOPATHY: ICD-10-CM

## 2024-11-12 DIAGNOSIS — G89.4 CHRONIC PAIN SYNDROME: ICD-10-CM

## 2024-11-12 LAB
ALBUMIN SERPL BCG-MCNC: 4.1 G/DL (ref 3.5–5.1)
ALP SERPL-CCNC: 72 U/L (ref 38–126)
ALT SERPL W/O P-5'-P-CCNC: 21 U/L (ref 11–66)
ANION GAP SERPL CALC-SCNC: 7 MEQ/L (ref 8–16)
AST SERPL-CCNC: 17 U/L (ref 5–40)
BASOPHILS ABSOLUTE: 0.1 THOU/MM3 (ref 0–0.1)
BASOPHILS NFR BLD AUTO: 1.8 %
BILIRUB SERPL-MCNC: 0.3 MG/DL (ref 0.3–1.2)
BUN SERPL-MCNC: 14 MG/DL (ref 7–22)
CALCIUM SERPL-MCNC: 9.4 MG/DL (ref 8.5–10.5)
CHLORIDE SERPL-SCNC: 108 MEQ/L (ref 98–111)
CO2 SERPL-SCNC: 27 MEQ/L (ref 23–33)
CREAT SERPL-MCNC: 0.9 MG/DL (ref 0.4–1.2)
CRP SERPL-MCNC: < 0.3 MG/DL (ref 0–1)
DEPRECATED RDW RBC AUTO: 47.8 FL (ref 35–45)
EOSINOPHIL NFR BLD AUTO: 2.8 %
EOSINOPHILS ABSOLUTE: 0.2 THOU/MM3 (ref 0–0.4)
ERYTHROCYTE [DISTWIDTH] IN BLOOD BY AUTOMATED COUNT: 14.2 % (ref 11.5–14.5)
ERYTHROCYTE [SEDIMENTATION RATE] IN BLOOD BY WESTERGREN METHOD: 12 MM/HR (ref 0–20)
GFR SERPL CREATININE-BSD FRML MDRD: 73 ML/MIN/1.73M2
GLUCOSE SERPL-MCNC: 83 MG/DL (ref 70–108)
HCT VFR BLD AUTO: 41.1 % (ref 37–47)
HGB BLD-MCNC: 13.3 GM/DL (ref 12–16)
IMM GRANULOCYTES # BLD AUTO: 0.01 THOU/MM3 (ref 0–0.07)
IMM GRANULOCYTES NFR BLD AUTO: 0.2 %
LYMPHOCYTES ABSOLUTE: 2.7 THOU/MM3 (ref 1–4.8)
LYMPHOCYTES NFR BLD AUTO: 48.1 %
MCH RBC QN AUTO: 29.4 PG (ref 26–33)
MCHC RBC AUTO-ENTMCNC: 32.4 GM/DL (ref 32.2–35.5)
MCV RBC AUTO: 90.7 FL (ref 81–99)
MONOCYTES ABSOLUTE: 0.5 THOU/MM3 (ref 0.4–1.3)
MONOCYTES NFR BLD AUTO: 9.1 %
NEUTROPHILS ABSOLUTE: 2.2 THOU/MM3 (ref 1.8–7.7)
NEUTROPHILS NFR BLD AUTO: 38 %
NRBC BLD AUTO-RTO: 0 /100 WBC
PLATELET # BLD AUTO: 288 THOU/MM3 (ref 130–400)
PMV BLD AUTO: 10.6 FL (ref 9.4–12.4)
POTASSIUM SERPL-SCNC: 4.4 MEQ/L (ref 3.5–5.2)
PROT SERPL-MCNC: 7 G/DL (ref 6.1–8)
RBC # BLD AUTO: 4.53 MILL/MM3 (ref 4.2–5.4)
SODIUM SERPL-SCNC: 142 MEQ/L (ref 135–145)
WBC # BLD AUTO: 5.7 THOU/MM3 (ref 4.8–10.8)

## 2024-11-12 PROCEDURE — 85025 COMPLETE CBC W/AUTO DIFF WBC: CPT

## 2024-11-12 PROCEDURE — 86140 C-REACTIVE PROTEIN: CPT

## 2024-11-12 PROCEDURE — 36415 COLL VENOUS BLD VENIPUNCTURE: CPT

## 2024-11-12 PROCEDURE — 99214 OFFICE O/P EST MOD 30 MIN: CPT | Performed by: NURSE PRACTITIONER

## 2024-11-12 PROCEDURE — 80053 COMPREHEN METABOLIC PANEL: CPT

## 2024-11-12 PROCEDURE — 85651 RBC SED RATE NONAUTOMATED: CPT

## 2024-11-12 RX ORDER — PREDNISONE 10 MG/1
TABLET ORAL
Qty: 30 TABLET | Refills: 0 | Status: SHIPPED | OUTPATIENT
Start: 2024-11-12

## 2024-11-12 NOTE — PROGRESS NOTES
Chronic Pain/PM&R Clinic Note     Encounter Date: 8/4/22    Subjective:   Chief Complaint:   Chief Complaint   Patient presents with    Follow-up     Discuss EMG        History of Present Illness:   Arlene Wharton is a 59 y.o. female seen in the clinic initially on 02/09/2022 upon request from Tre Tapia DO for her history of bilateral knee, right shoulder and left elbow pain.  Patient states her biggest pain generator at this time is her bilateral hips.  Patient states she has had hip pain for over 10 years.  She initially started seeing Dr. Doe at [facility] in 2012 or 2013 for this issue.  Patient states at that time she did have an MRI of her bilateral hips but she does not recall the results.  She was never told that she had any significant findings.  Patient states she currently uses a back brace to assist with her low back pain.  She will only wear this when she is out and about or driving.  Patient states last time she did physical therapy for her hips was around 2016.  She states this was not beneficial.  Patient denies any recent imaging of her hips.  Patient states she does have a history of degenerative disc disease.  Patient states pain is aggravated with activity, standing, sitting.  She states her right hip is significantly worse than her left.  She cannot lay on her right side.  Patient states she is not sleeping well due to pain.  Patient states she takes Tylenol and Aleve which do help take the edge off.  Patient states when she is in significant pain she has to sit down and rest.  He also provides minimal relief, while ice is ineffective.  Patient denies use of an assistive device.  She denies history of falls.  Patient does state that she noticed that she limps when she walks, especially after increased activity.  Patient states she is currently living at home with her boyfriend.  Patient states she is not currently working as she is trying to get disability.  Patient used to work in the lawn

## 2024-11-12 NOTE — PROGRESS NOTES
Functionality Assessment/Goals Worksheet     On a scale of 0 (Does not Interfere) to 10 (Completely Interferes)     1.  Which number describes how during the past week pain has interfered with           the following:  A.  General Activity:  3  B.  Mood: 0  C.  Walking Ability:  4  D.  Normal Work (Includes both work outside the home and housework):  4  E.  Relations with Other People:   0  F.  Sleep:   5  G.  Enjoyment of Life:   1    2.  Patient Prefers to Take their Pain Medications:     [x]  On a regular basis   []  Only when necessary    []  Does not take pain medications    3.  What are the Patient's Goals/Expectations for Visiting Pain Management?     [x]  Learn about my pain    []  Receive Medication   []  Physical Therapy     []  Treat Depression   []  Receive Injections    []  Treat Sleep   []  Deal with Anxiety and Stress   []  Treat Opoid Dependence/Addiction   []  Other:

## 2024-12-03 RX ORDER — PREDNISONE 10 MG/1
TABLET ORAL
Qty: 30 TABLET | Refills: 0 | OUTPATIENT
Start: 2024-12-03

## 2024-12-03 NOTE — TELEPHONE ENCOUNTER
Arlene Wharton called requesting a refill on the following medications:  Requested Prescriptions     Pending Prescriptions Disp Refills    predniSONE (DELTASONE) 10 MG tablet 30 tablet 0     Si tabs oral daily for 3 days, then 3 tabs oral daily for 3 days, then 2 tabs oral daily for 3 days, then 1 tab oral daily for 3 days.     Pharmacy verified: [facility] in [place]  .      Date of last visit: 2024  Date of next visit (if applicable): Visit date not found

## 2025-01-07 RX ORDER — MELOXICAM 15 MG/1
TABLET ORAL
Qty: 30 TABLET | Refills: 2 | Status: SHIPPED | OUTPATIENT
Start: 2025-01-07

## 2025-01-14 ENCOUNTER — OFFICE VISIT (OUTPATIENT)
Dept: PHYSICAL MEDICINE AND REHAB | Age: 60
End: 2025-01-14
Payer: MEDICAID

## 2025-01-14 VITALS
DIASTOLIC BLOOD PRESSURE: 86 MMHG | HEIGHT: 64 IN | BODY MASS INDEX: 32.27 KG/M2 | SYSTOLIC BLOOD PRESSURE: 136 MMHG | WEIGHT: 189 LBS

## 2025-01-14 DIAGNOSIS — M70.61 GREATER TROCHANTERIC BURSITIS OF RIGHT HIP: Primary | ICD-10-CM

## 2025-01-14 DIAGNOSIS — M25.551 RIGHT HIP PAIN: ICD-10-CM

## 2025-01-14 DIAGNOSIS — M53.3 SACROILIAC JOINT DYSFUNCTION OF RIGHT SIDE: ICD-10-CM

## 2025-01-14 DIAGNOSIS — M54.16 LUMBAR RADICULOPATHY: ICD-10-CM

## 2025-01-14 DIAGNOSIS — G89.4 CHRONIC PAIN SYNDROME: ICD-10-CM

## 2025-01-14 PROCEDURE — 99214 OFFICE O/P EST MOD 30 MIN: CPT | Performed by: NURSE PRACTITIONER

## 2025-01-14 RX ORDER — ORPHENADRINE CITRATE 100 MG/1
100 TABLET ORAL 2 TIMES DAILY PRN
Qty: 20 TABLET | Refills: 0 | Status: SHIPPED | OUTPATIENT
Start: 2025-01-14 | End: 2025-01-24

## 2025-01-14 NOTE — PROGRESS NOTES
Functionality Assessment/Goals Worksheet     On a scale of 0 (Does not Interfere) to 10 (Completely Interferes)     1.  Which number describes how during the past week pain has interfered with           the following:  A.  General Activity:  4  B.  Mood: 1  C.  Walking Ability:  7  D.  Normal Work (Includes both work outside the home and housework):  7  E.  Relations with Other People:   0  F.  Sleep:   3  G.  Enjoyment of Life:   0    2.  Patient Prefers to Take their Pain Medications:     [x]  On a regular basis   []  Only when necessary    []  Does not take pain medications    3.  What are the Patient's Goals/Expectations for Visiting Pain Management?     [x]  Learn about my pain    []  Receive Medication   []  Physical Therapy     []  Treat Depression   []  Receive Injections    []  Treat Sleep   []  Deal with Anxiety and Stress   []  Treat Opoid Dependence/Addiction   []  Other:                                
nerve block.  She also has right SI joint dysfunction and may benefit from a repeat SI joint injection or advanced SI joint block.  She will consider injections moving forward.  I have started her on Norflex 100 mg twice daily as needed.  Last visit, I encouraged her to continue to work on weight loss efforts.  She failed to respond to physical therapy regards to her low back and hip pain.  We discussed the importance of continuing exercises at home, which she has been doing.  She states she does have a folder from physical therapy in the past that she uses daily.       Plan:   The following treatment recommendations and plan were discussed in detail with Arlene Wharton.    Imaging:   Lumbar MRI and pelvic MRI reviewed and discussed with the patient.     Analgesics:   Patient is taking Acetaminophen. Patient informed that the maximum amount of acetaminophen taken on a regular basis should only be 4000 mg per day.     I have continued her Mobic 15mg daily. Patient is advised to take as prescribed and not take on an empty stomach.     Adjuvants:   None    Interventions:   None     Multidisciplinary Pain Management:   In the presence of complex, chronic, and multi-factorial pain, the importance of a multidisciplinary approach to pain management in the patient’s management regimen was emphasized and discussed in great detail.   PHYSICAL THERAPY: Patient is advised to see a physical therapist for gentle stretching exercises and conditioning exercises for management of pain.     Referrals:  None     Prescriptions Written This Visit:   Norflex 100 mg    Follow-up: 4 weeks    MARBIN Hebert - HARIKA  Interventional Pain Management/PM&R   [facility] [place]

## 2025-02-10 ENCOUNTER — LAB (OUTPATIENT)
Dept: LAB | Age: 60
End: 2025-02-10

## 2025-02-10 ENCOUNTER — OFFICE VISIT (OUTPATIENT)
Age: 60
End: 2025-02-10
Payer: MEDICAID

## 2025-02-10 VITALS
HEART RATE: 65 BPM | DIASTOLIC BLOOD PRESSURE: 84 MMHG | OXYGEN SATURATION: 96 % | BODY MASS INDEX: 32.26 KG/M2 | SYSTOLIC BLOOD PRESSURE: 122 MMHG | WEIGHT: 188.93 LBS | HEIGHT: 64 IN

## 2025-02-10 DIAGNOSIS — Z79.899 ENCOUNTER FOR LONG-TERM (CURRENT) USE OF HIGH-RISK MEDICATION: ICD-10-CM

## 2025-02-10 DIAGNOSIS — L40.50 PSORIATIC ARTHRITIS (HCC): ICD-10-CM

## 2025-02-10 DIAGNOSIS — L40.50 PSORIATIC ARTHRITIS (HCC): Primary | ICD-10-CM

## 2025-02-10 DIAGNOSIS — L40.0 PLAQUE PSORIASIS: ICD-10-CM

## 2025-02-10 LAB
ALBUMIN SERPL BCG-MCNC: 4.1 G/DL (ref 3.5–5.1)
ALP SERPL-CCNC: 65 U/L (ref 38–126)
ALT SERPL W/O P-5'-P-CCNC: 16 U/L (ref 11–66)
ANION GAP SERPL CALC-SCNC: 9 MEQ/L (ref 8–16)
AST SERPL-CCNC: 15 U/L (ref 5–40)
BASOPHILS ABSOLUTE: 0.1 THOU/MM3 (ref 0–0.1)
BASOPHILS NFR BLD AUTO: 2.3 %
BILIRUB SERPL-MCNC: 0.3 MG/DL (ref 0.3–1.2)
BUN SERPL-MCNC: 17 MG/DL (ref 7–22)
CALCIUM SERPL-MCNC: 9.5 MG/DL (ref 8.5–10.5)
CHLORIDE SERPL-SCNC: 107 MEQ/L (ref 98–111)
CO2 SERPL-SCNC: 26 MEQ/L (ref 23–33)
CREAT SERPL-MCNC: 0.9 MG/DL (ref 0.4–1.2)
CRP SERPL-MCNC: < 0.3 MG/DL (ref 0–1)
DEPRECATED RDW RBC AUTO: 48.2 FL (ref 35–45)
EOSINOPHIL NFR BLD AUTO: 3.8 %
EOSINOPHILS ABSOLUTE: 0.2 THOU/MM3 (ref 0–0.4)
ERYTHROCYTE [DISTWIDTH] IN BLOOD BY AUTOMATED COUNT: 14.2 % (ref 11.5–14.5)
ERYTHROCYTE [SEDIMENTATION RATE] IN BLOOD BY WESTERGREN METHOD: 20 MM/HR (ref 0–20)
GFR SERPL CREATININE-BSD FRML MDRD: 73 ML/MIN/1.73M2
GLUCOSE SERPL-MCNC: 79 MG/DL (ref 70–108)
HCT VFR BLD AUTO: 39.4 % (ref 37–47)
HGB BLD-MCNC: 12.7 GM/DL (ref 12–16)
IMM GRANULOCYTES # BLD AUTO: 0 THOU/MM3 (ref 0–0.07)
IMM GRANULOCYTES NFR BLD AUTO: 0 %
LYMPHOCYTES ABSOLUTE: 2.5 THOU/MM3 (ref 1–4.8)
LYMPHOCYTES NFR BLD AUTO: 47.1 %
MCH RBC QN AUTO: 29.6 PG (ref 26–33)
MCHC RBC AUTO-ENTMCNC: 32.2 GM/DL (ref 32.2–35.5)
MCV RBC AUTO: 91.8 FL (ref 81–99)
MONOCYTES ABSOLUTE: 0.5 THOU/MM3 (ref 0.4–1.3)
MONOCYTES NFR BLD AUTO: 9.7 %
NEUTROPHILS ABSOLUTE: 2 THOU/MM3 (ref 1.8–7.7)
NEUTROPHILS NFR BLD AUTO: 37.1 %
NRBC BLD AUTO-RTO: 0 /100 WBC
PLATELET # BLD AUTO: 284 THOU/MM3 (ref 130–400)
PMV BLD AUTO: 10.8 FL (ref 9.4–12.4)
POTASSIUM SERPL-SCNC: 4 MEQ/L (ref 3.5–5.2)
PROT SERPL-MCNC: 6.7 G/DL (ref 6.1–8)
RBC # BLD AUTO: 4.29 MILL/MM3 (ref 4.2–5.4)
SODIUM SERPL-SCNC: 142 MEQ/L (ref 135–145)
WBC # BLD AUTO: 5.3 THOU/MM3 (ref 4.8–10.8)

## 2025-02-10 PROCEDURE — 99214 OFFICE O/P EST MOD 30 MIN: CPT | Performed by: NURSE PRACTITIONER

## 2025-02-10 PROCEDURE — 99213 OFFICE O/P EST LOW 20 MIN: CPT | Performed by: NURSE PRACTITIONER

## 2025-02-10 RX ORDER — MUPIROCIN CALCIUM 20 MG/G
CREAM TOPICAL
COMMUNITY
Start: 2025-02-04

## 2025-02-10 RX ORDER — LEFLUNOMIDE 20 MG/1
20 TABLET ORAL DAILY
Qty: 30 TABLET | Refills: 0 | Status: SHIPPED | OUTPATIENT
Start: 2025-02-10 | End: 2025-05-11

## 2025-02-10 ASSESSMENT — ENCOUNTER SYMPTOMS: BACK PAIN: 1

## 2025-02-10 NOTE — PROGRESS NOTES
[facility]   Rheumatology Clinic Note      2/10/2025       CHIEF COMPLAINT:    Chief Complaint   Patient presents with    Follow-up     3 month follow up Psoriatic arthritis (HCC)    Joint Pain     Bilateral hip pain  Pain level 6           HISTORY OF PRESENT ILLNESS:    59 y.o. female with psoriatic arthritis and psoriasis presents for follow up. Presently she is on Humira 40 mg SQ every 2 weeks (started 09/2024) and leflunomide 20 mg daily.    Past meds: Taltz (was managed by dermatology for psoriasis)     Overall the patient is doing well. She has chronic bilateral hip pain. She states her swelling has improved and she has not really had any episodes of painful, swollen joints. She has morning stiffness that lasts about 15-20 minutes. She currently has psoriasis on her scalp, mildly on her face, and to both feet. She is using a cream to these areas. She was told her S1 \"is pinched on the right side\" and has an appointment with pain management tomorrow.     Recap:  Hx right shoulder surgery for rotator cuff- has 6 screws  Follows with pain management- has had nerve block injections in the past  Hx of skin psoriasis- has seen dermatology in the past    Past Medical History:     has a past medical history of Osteoarthritis and Shingles.    Past Surgical History:     has a past surgical history that includes Rotator cuff repair (Right); Carpal tunnel release (Bilateral); Tubal ligation; Cataract removal (Left); Back Injection (Bilateral, 3/1/2022); hip surgery (Right, 8/30/2022); Pain management procedure (Bilateral, 1/10/2023); and Back Injection (Bilateral, 5/9/2023).    Current Medications:      Current Outpatient Medications:     mupirocin (BACTROBAN) 2 % cream, APPLY TO NASAL VESTIBULES ( 1/2 GRAM) TWICE DAILY, Disp: , Rfl:     adalimumab (HUMIRA) 40 MG/0.4ML UMicIt injection pen kit, Inject 0.4 mLs into the skin every 14 days, Disp: 0.8 mL, Rfl: 5    leflunomide (ARAVA) 20 MG tablet, Take 1 tablet by

## 2025-02-11 ENCOUNTER — OFFICE VISIT (OUTPATIENT)
Dept: PHYSICAL MEDICINE AND REHAB | Age: 60
End: 2025-02-11
Payer: MEDICAID

## 2025-02-11 VITALS
HEIGHT: 64 IN | DIASTOLIC BLOOD PRESSURE: 76 MMHG | SYSTOLIC BLOOD PRESSURE: 142 MMHG | WEIGHT: 188 LBS | BODY MASS INDEX: 32.1 KG/M2

## 2025-02-11 DIAGNOSIS — M25.551 RIGHT HIP PAIN: Primary | ICD-10-CM

## 2025-02-11 DIAGNOSIS — M53.3 SACROILIAC JOINT DYSFUNCTION OF RIGHT SIDE: ICD-10-CM

## 2025-02-11 DIAGNOSIS — G89.4 CHRONIC PAIN SYNDROME: ICD-10-CM

## 2025-02-11 DIAGNOSIS — M47.816 LUMBAR SPONDYLOSIS: ICD-10-CM

## 2025-02-11 DIAGNOSIS — M54.16 LUMBAR RADICULOPATHY: ICD-10-CM

## 2025-02-11 PROCEDURE — 99214 OFFICE O/P EST MOD 30 MIN: CPT | Performed by: NURSE PRACTITIONER

## 2025-02-11 RX ORDER — ORPHENADRINE CITRATE 100 MG/1
100 TABLET ORAL 2 TIMES DAILY
Qty: 60 TABLET | Refills: 2 | Status: SHIPPED | OUTPATIENT
Start: 2025-02-11 | End: 2025-05-12

## 2025-02-11 NOTE — PROGRESS NOTES
Functionality Assessment/Goals Worksheet     On a scale of 0 (Does not Interfere) to 10 (Completely Interferes)     1.  Which number describes how during the past week pain has interfered with           the following:  A.  General Activity:  4  B.  Mood: 0  C.  Walking Ability:  5  D.  Normal Work (Includes both work outside the home and housework):  5  E.  Relations with Other People:   0  F.  Sleep:   5  G.  Enjoyment of Life:   0    2.  Patient Prefers to Take their Pain Medications:     [x]  On a regular basis   []  Only when necessary    []  Does not take pain medications    3.  What are the Patient's Goals/Expectations for Visiting Pain Management?     [x]  Learn about my pain    []  Receive Medication   []  Physical Therapy     []  Treat Depression   []  Receive Injections    []  Treat Sleep   []  Deal with Anxiety and Stress   []  Treat Opoid Dependence/Addiction   []  Other:                                
SURGERY Right 8/30/2022    right knee injection with Synvisc. performed by Sergey Barcenas DO at [facility]    PAIN MANAGEMENT PROCEDURE Bilateral 1/10/2023    Lumbar 4/5, 5/Sacral 1 facet steroid injection bilateral performed by Sergey Barcenas DO at [facility]    ROTATOR CUFF REPAIR Right     TUBAL LIGATION         Family History   Problem Relation Age of Onset    Other Mother          Medications & Allergies:   Current Outpatient Medications   Medication Instructions    adalimumab (HUMIRA) 40 mg, SubCUTAneous, EVERY 14 DAYS    atorvastatin (LIPITOR) 40 mg, Oral, DAILY    clobetasol (TEMOVATE) 0.05 % cream Topical, 2 TIMES DAILY, Apply topically 2 times daily.    ketoconazole (NIZORAL) 2 % cream Topical, DAILY, Apply topically daily.    leflunomide (ARAVA) 20 mg, Oral, DAILY    losartan (COZAAR) 100 mg, Oral, DAILY    meloxicam (MOBIC) 15 MG tablet TAKE 1 TABLET BY MOUTH IN THE MORNING    mupirocin (BACTROBAN) 2 % cream APPLY TO NASAL VESTIBULES ( 1/2 GRAM) TWICE DAILY    orphenadrine (NORFLEX) 100 mg, Oral, 2 TIMES DAILY    PROAIR  (90 Base) MCG/ACT inhaler inhale 2 puffs by mouth every 6 hours if needed       Allergies   Allergen Reactions    Baclofen      Double vision, dry throat and head pain        Review of Systems:   Constitutional: negative for weight changes or fevers  Genitourinary: negative for bowel/bladder incontinence   Musculoskeletal: positive for low back pain, bilateral hip pain (right>left), widespread joint pain  Neurological: negative for any leg weakness or numbness/tingling  Behavioral/Psych: negative for anxiety/depression   All other systems reviewed and are negative    Objective:     Vitals:    02/11/25 1058   BP: (!) 142/76     Constitutional: Pleasant, no acute distress   Head: Normocephalic, atraumatic   Eyes: Conjunctivae normal   Neck: Supple, symmetrical   Lungs: Normal respiratory effort, non-labored breathing   Cardiovascular: Limbs warm and

## 2025-04-07 RX ORDER — MELOXICAM 15 MG/1
15 TABLET ORAL EVERY MORNING
Qty: 30 TABLET | Refills: 2 | Status: SHIPPED | OUTPATIENT
Start: 2025-04-07 | End: 2025-07-06

## 2025-04-14 ENCOUNTER — OFFICE VISIT (OUTPATIENT)
Dept: PHYSICAL MEDICINE AND REHAB | Age: 60
End: 2025-04-14
Payer: MEDICAID

## 2025-04-14 VITALS
DIASTOLIC BLOOD PRESSURE: 72 MMHG | HEIGHT: 64 IN | BODY MASS INDEX: 32.1 KG/M2 | SYSTOLIC BLOOD PRESSURE: 106 MMHG | WEIGHT: 188 LBS

## 2025-04-14 DIAGNOSIS — G89.4 CHRONIC PAIN SYNDROME: ICD-10-CM

## 2025-04-14 DIAGNOSIS — M47.816 LUMBAR SPONDYLOSIS: ICD-10-CM

## 2025-04-14 DIAGNOSIS — M25.551 RIGHT HIP PAIN: ICD-10-CM

## 2025-04-14 DIAGNOSIS — M53.3 SACROILIAC JOINT DYSFUNCTION OF RIGHT SIDE: Primary | ICD-10-CM

## 2025-04-14 DIAGNOSIS — M54.16 LUMBAR RADICULOPATHY: ICD-10-CM

## 2025-04-14 PROCEDURE — 99214 OFFICE O/P EST MOD 30 MIN: CPT | Performed by: NURSE PRACTITIONER

## 2025-04-14 RX ORDER — ORPHENADRINE CITRATE 100 MG/1
100 TABLET ORAL 2 TIMES DAILY
Qty: 60 TABLET | Refills: 2 | Status: SHIPPED | OUTPATIENT
Start: 2025-04-14 | End: 2025-07-13

## 2025-04-14 NOTE — PROGRESS NOTES
Functionality Assessment/Goals Worksheet     On a scale of 0 (Does not Interfere) to 10 (Completely Interferes)     1.  Which number describes how during the past week pain has interfered with           the following:  A.  General Activity:  5  B.  Mood: 0  C.  Walking Ability:  6  D.  Normal Work (Includes both work outside the home and housework):  6  E.  Relations with Other People:   0  F.  Sleep:   5  G.  Enjoyment of Life:   2    2.  Patient Prefers to Take their Pain Medications:     [x]  On a regular basis   []  Only when necessary    []  Does not take pain medications    3.  What are the Patient's Goals/Expectations for Visiting Pain Management?     [x]  Learn about my pain    []  Receive Medication   []  Physical Therapy     []  Treat Depression   []  Receive Injections    []  Treat Sleep   []  Deal with Anxiety and Stress   []  Treat Opoid Dependence/Addiction   []  Other:                                
past.  She denies any new symptoms at this time.    Today, 04/10/2024, patient presents for planned follow up on chronic pain.  Patient states she has been having a lot of spasms in her legs.  She states its mainly in the back of her legs to her ankles.  She states most of her spasms occur at rest.  She states she gets some altered sensations that feel somewhat like tingling.  She relates the pain in her legs do feel similar to when she had carpal tunnel in her hands.  She has also noticed increased pain in her hip/sacroiliac joints that she has had in the past.  She feels the injections have become less effective and have been painful and she would like to try medication management first.  She states she has been doing her exercises for her arms and legs at home.  She has had some leg weakness and sustained 2 falls since her last visit 3 months ago.  She denies any saddle anesthesia or bowel/bladder incontinence.    Today, 05/08/2024, patient presents for planned follow-up on chronic pain.  Patient states she has been getting numbness in her hips as well as pain in her right groin.  She states she is getting burning, numbness, tingling down her right leg to her knee and down her left leg to her ankle.  She states the pain in her left leg hits her anterior thigh as well as the back of her thigh and into her calf to her ankle.  She states pain does not travel into her feet.  She denies any falls since last visit.  She is increased her Lyrica to 75 mg in the morning and 150 mg at night.  She states when she falls asleep she has been sleeping better.  She denies any side effects to this medication.  She states the meloxicam has been helping.  She has not had any recent imaging of her right hip.  She states she does occasionally feel like her right hip pops or dislocates.  She states pain is better when standing versus sitting but most bothersome when lying in bed at night.  She denies any saddle anesthesia or

## 2025-05-12 ENCOUNTER — OFFICE VISIT (OUTPATIENT)
Age: 60
End: 2025-05-12
Payer: MEDICAID

## 2025-05-12 ENCOUNTER — LAB (OUTPATIENT)
Dept: LAB | Age: 60
End: 2025-05-12

## 2025-05-12 VITALS
SYSTOLIC BLOOD PRESSURE: 124 MMHG | WEIGHT: 193.3 LBS | HEIGHT: 64 IN | OXYGEN SATURATION: 98 % | BODY MASS INDEX: 33 KG/M2 | DIASTOLIC BLOOD PRESSURE: 72 MMHG | HEART RATE: 68 BPM

## 2025-05-12 DIAGNOSIS — Z79.899 ENCOUNTER FOR LONG-TERM (CURRENT) USE OF HIGH-RISK MEDICATION: Primary | ICD-10-CM

## 2025-05-12 DIAGNOSIS — Z79.899 ENCOUNTER FOR LONG-TERM (CURRENT) USE OF HIGH-RISK MEDICATION: ICD-10-CM

## 2025-05-12 DIAGNOSIS — L40.50 PSORIATIC ARTHRITIS (HCC): Primary | ICD-10-CM

## 2025-05-12 DIAGNOSIS — L40.0 PLAQUE PSORIASIS: ICD-10-CM

## 2025-05-12 LAB
ALBUMIN SERPL BCG-MCNC: 4.1 G/DL (ref 3.4–4.9)
ALP SERPL-CCNC: 68 U/L (ref 38–126)
ALT SERPL W/O P-5'-P-CCNC: 18 U/L (ref 10–35)
ANION GAP SERPL CALC-SCNC: 9 MEQ/L (ref 8–16)
AST SERPL-CCNC: 18 U/L (ref 10–35)
BASOPHILS ABSOLUTE: 0.1 THOU/MM3 (ref 0–0.1)
BASOPHILS NFR BLD AUTO: 1.7 %
BILIRUB SERPL-MCNC: 0.3 MG/DL (ref 0.3–1.2)
BUN SERPL-MCNC: 20 MG/DL (ref 8–23)
CALCIUM SERPL-MCNC: 9.6 MG/DL (ref 8.8–10.2)
CHLORIDE SERPL-SCNC: 107 MEQ/L (ref 98–111)
CO2 SERPL-SCNC: 27 MEQ/L (ref 22–29)
CREAT SERPL-MCNC: 1.1 MG/DL (ref 0.5–0.9)
CRP SERPL-MCNC: 0.42 MG/DL (ref 0–0.5)
DEPRECATED RDW RBC AUTO: 47.7 FL (ref 35–45)
EOSINOPHIL NFR BLD AUTO: 6.8 %
EOSINOPHILS ABSOLUTE: 0.4 THOU/MM3 (ref 0–0.4)
ERYTHROCYTE [DISTWIDTH] IN BLOOD BY AUTOMATED COUNT: 13.9 % (ref 11.5–14.5)
ERYTHROCYTE [SEDIMENTATION RATE] IN BLOOD BY WESTERGREN METHOD: 12 MM/HR (ref 0–20)
GFR SERPL CREATININE-BSD FRML MDRD: 57 ML/MIN/1.73M2
GLUCOSE SERPL-MCNC: 50 MG/DL (ref 74–109)
HCT VFR BLD AUTO: 41.2 % (ref 37–47)
HGB BLD-MCNC: 13.2 GM/DL (ref 12–16)
IMM GRANULOCYTES # BLD AUTO: 0.01 THOU/MM3 (ref 0–0.07)
IMM GRANULOCYTES NFR BLD AUTO: 0.2 %
LYMPHOCYTES ABSOLUTE: 2.6 THOU/MM3 (ref 1–4.8)
LYMPHOCYTES NFR BLD AUTO: 43.4 %
MCH RBC QN AUTO: 29.7 PG (ref 26–33)
MCHC RBC AUTO-ENTMCNC: 32 GM/DL (ref 32.2–35.5)
MCV RBC AUTO: 92.6 FL (ref 81–99)
MONOCYTES ABSOLUTE: 0.7 THOU/MM3 (ref 0.4–1.3)
MONOCYTES NFR BLD AUTO: 11.1 %
NEUTROPHILS ABSOLUTE: 2.2 THOU/MM3 (ref 1.8–7.7)
NEUTROPHILS NFR BLD AUTO: 36.8 %
NRBC BLD AUTO-RTO: 0 /100 WBC
PLATELET # BLD AUTO: 298 THOU/MM3 (ref 130–400)
PMV BLD AUTO: 11.4 FL (ref 9.4–12.4)
POTASSIUM SERPL-SCNC: 4.4 MEQ/L (ref 3.5–5.2)
PROT SERPL-MCNC: 6.7 G/DL (ref 6.4–8.3)
RBC # BLD AUTO: 4.45 MILL/MM3 (ref 4.2–5.4)
SODIUM SERPL-SCNC: 143 MEQ/L (ref 135–145)
WBC # BLD AUTO: 5.9 THOU/MM3 (ref 4.8–10.8)

## 2025-05-12 PROCEDURE — 99214 OFFICE O/P EST MOD 30 MIN: CPT | Performed by: NURSE PRACTITIONER

## 2025-05-12 PROCEDURE — 99213 OFFICE O/P EST LOW 20 MIN: CPT | Performed by: NURSE PRACTITIONER

## 2025-05-12 RX ORDER — LEFLUNOMIDE 20 MG/1
20 TABLET ORAL DAILY
Qty: 30 TABLET | Refills: 0 | Status: SHIPPED | OUTPATIENT
Start: 2025-05-12 | End: 2025-08-10

## 2025-05-12 RX ORDER — FAMOTIDINE 40 MG/1
40 TABLET, FILM COATED ORAL NIGHTLY
COMMUNITY
Start: 2025-05-05

## 2025-05-12 RX ORDER — PANTOPRAZOLE SODIUM 40 MG/1
TABLET, DELAYED RELEASE ORAL
COMMUNITY
Start: 2025-05-05

## 2025-05-12 ASSESSMENT — ENCOUNTER SYMPTOMS: BACK PAIN: 0

## 2025-05-12 NOTE — PROGRESS NOTES
Regular rate and rhythm without murmurs, rubs or gallops.  Musculoskeletal: No clubbing, cyanosis or edema bilaterally. No joint pain or swelling on exam.  Skin: Pink, warm, dry. No lesions. Small red dots scattered across the back, non-raised, non-pruritic  Neurologic: Alert and oriented, thought content appropriate, normal insight    Vitals:    /72 (BP Site: Left Upper Arm, Patient Position: Sitting, BP Cuff Size: Large Adult)   Pulse 68   Ht 1.621 m (5' 3.82\")   Wt 87.7 kg (193 lb 4.8 oz)   SpO2 98%   BMI 33.37 kg/m²     LABS:      Lab Results   Component Value Date    WBC 5.3 02/10/2025    HGB 12.7 02/10/2025    HCT 39.4 02/10/2025    MCV 91.8 02/10/2025     02/10/2025      Lab Results   Component Value Date     02/10/2025    K 4.0 02/10/2025     02/10/2025    CO2 26 02/10/2025    BUN 17 02/10/2025    CREATININE 0.9 02/10/2025    GLUCOSE 79 02/10/2025    CALCIUM 9.5 02/10/2025    BILITOT 0.3 02/10/2025    ALKPHOS 65 02/10/2025    AST 15 02/10/2025    ALT 16 02/10/2025    LABGLOM 73 02/10/2025     Lab Results   Component Value Date    SEDRATE 20 02/10/2025      Lab Results   Component Value Date    CRP < 0.30 02/10/2025     Lab Results   Component Value Date    ANASCRN None Detected 03/20/2023     Lab Results   Component Value Date    HEPAIGM Negative 08/05/2024    HEPBIGM Negative 08/05/2024    HEPCAB Negative 08/05/2024      Lab Results   Component Value Date/Time    TBGOLDPLUS 9.93 08/05/2024 11:12 AM    TBGOLDPLUS Negative 08/05/2024 11:12 AM    QFTNIL 0.07 08/05/2024 11:12 AM     Lab Results   Component Value Date/Time    RF < 10 03/20/2023 12:10 PM      Lab Results   Component Value Date    URICACID 3.5 03/20/2023      Lab Results   Component Value Date/Time    CYCCITPEPABG 1.4 03/20/2023 12:10 PM     RADIOLOGY:   XR SI joints 03/20/23  FINDINGS: No fracture or bone destruction is seen. The sacroiliac joints are intact. There are very mild degenerative changes both sacroiliac

## 2025-06-07 DIAGNOSIS — L40.50 PSORIATIC ARTHRITIS (HCC): ICD-10-CM

## 2025-06-09 DIAGNOSIS — L40.50 PSORIATIC ARTHRITIS (HCC): ICD-10-CM

## 2025-06-09 RX ORDER — LEFLUNOMIDE 20 MG/1
20 TABLET ORAL DAILY
Qty: 60 TABLET | Refills: 0 | Status: SHIPPED | OUTPATIENT
Start: 2025-06-09 | End: 2025-08-08

## 2025-06-09 RX ORDER — MELOXICAM 15 MG/1
15 TABLET ORAL EVERY MORNING
Qty: 30 TABLET | Refills: 2 | OUTPATIENT
Start: 2025-06-09 | End: 2025-09-07

## 2025-06-09 RX ORDER — LEFLUNOMIDE 20 MG/1
20 TABLET ORAL DAILY
Qty: 30 TABLET | Refills: 0 | OUTPATIENT
Start: 2025-06-09

## 2025-06-09 NOTE — TELEPHONE ENCOUNTER
Arlene Wharton called requesting a refill on the following medications:  Requested Prescriptions     Pending Prescriptions Disp Refills    meloxicam (MOBIC) 15 MG tablet 30 tablet 2     Sig: Take 1 tablet by mouth every morning     Pharmacy verified:  [facility]      Date of last visit: 04-14-25  Date of next visit (if applicable): 7/8/2025

## 2025-06-09 NOTE — TELEPHONE ENCOUNTER
Arlene Wharton called requesting a refill on the following medications:  Requested Prescriptions     Pending Prescriptions Disp Refills    leflunomide (ARAVA) 20 MG tablet 30 tablet 0     Sig: Take 1 tablet by mouth daily    adalimumab (HUMIRA) 40 MG/0.4ML AJKT injection pen kit 0.8 mL 5     Sig: Inject 0.4 mLs into the skin every 14 days     Pharmacy verified:[facility] [place]  [facility]      Date of last visit: 5/12/25  Date of next visit (if applicable): 8/12/2025

## 2025-07-03 RX ORDER — MELOXICAM 15 MG/1
15 TABLET ORAL EVERY MORNING
Qty: 30 TABLET | Refills: 2 | Status: SHIPPED | OUTPATIENT
Start: 2025-07-03 | End: 2025-07-08 | Stop reason: SDUPTHER

## 2025-07-08 ENCOUNTER — OFFICE VISIT (OUTPATIENT)
Dept: PHYSICAL MEDICINE AND REHAB | Age: 60
End: 2025-07-08
Payer: MEDICAID

## 2025-07-08 VITALS
DIASTOLIC BLOOD PRESSURE: 78 MMHG | WEIGHT: 193 LBS | BODY MASS INDEX: 32.95 KG/M2 | SYSTOLIC BLOOD PRESSURE: 114 MMHG | HEIGHT: 64 IN

## 2025-07-08 DIAGNOSIS — M53.3 SACROILIAC JOINT DYSFUNCTION OF RIGHT SIDE: Primary | ICD-10-CM

## 2025-07-08 DIAGNOSIS — M54.16 LUMBAR RADICULOPATHY: ICD-10-CM

## 2025-07-08 DIAGNOSIS — M25.551 RIGHT HIP PAIN: ICD-10-CM

## 2025-07-08 DIAGNOSIS — G89.4 CHRONIC PAIN SYNDROME: ICD-10-CM

## 2025-07-08 DIAGNOSIS — M47.816 LUMBAR SPONDYLOSIS: ICD-10-CM

## 2025-07-08 PROCEDURE — 99214 OFFICE O/P EST MOD 30 MIN: CPT | Performed by: NURSE PRACTITIONER

## 2025-07-08 RX ORDER — MELOXICAM 15 MG/1
15 TABLET ORAL EVERY MORNING
Qty: 30 TABLET | Refills: 2 | Status: SHIPPED | OUTPATIENT
Start: 2025-07-08

## 2025-07-08 RX ORDER — ORPHENADRINE CITRATE 100 MG/1
100 TABLET ORAL 2 TIMES DAILY
Qty: 60 TABLET | Refills: 5 | Status: SHIPPED | OUTPATIENT
Start: 2025-07-08 | End: 2026-01-04

## 2025-07-08 NOTE — PROGRESS NOTES
Functionality Assessment/Goals Worksheet     On a scale of 0 (Does not Interfere) to 10 (Completely Interferes)     1.  Which number describes how during the past week pain has interfered with           the following:  A.  General Activity:  3  B.  Mood: 0  C.  Walking Ability:  7  D.  Normal Work (Includes both work outside the home and housework):  7  E.  Relations with Other People:   0  F.  Sleep:   5  G.  Enjoyment of Life:   0    2.  Patient Prefers to Take their Pain Medications:     [x]  On a regular basis   []  Only when necessary    []  Does not take pain medications    3.  What are the Patient's Goals/Expectations for Visiting Pain Management?     [x]  Learn about my pain    []  Receive Medication   []  Physical Therapy     []  Treat Depression   []  Receive Injections    []  Treat Sleep   []  Deal with Anxiety and Stress   []  Treat Opoid Dependence/Addiction   []  Other:                                
feels like popping and dislocation.  Her pelvic MRI was within normal limits, noting the degenerative changes in her lumbar spine.  Her EMG showed a right S1 radiculopathy.  We discussed in the past doing an S1 nerve block.  She also has right SI joint dysfunction and may benefit from a repeat SI joint injection or advanced SI joint block.  She will consider injections moving forward.  I have continued her Norflex 100 mg twice daily as needed.  Last visit, I encouraged her to continue to work on weight loss efforts.  She failed to respond to physical therapy regards to her low back and hip pain.  We discussed the importance of continuing exercises at home, which she has been doing.  She states she does have a folder from physical therapy in the past that she uses daily.     Interval update 07/08/2025: Patient is doing well meloxicam and Norflex and I have continued these medications.  We will follow-up in 6 months.  She will continue to work with rheumatology for her psoriatic arthritis.    Plan:   The following treatment recommendations and plan were discussed in detail with Arlene Wharton.    Imaging:   Lumbar MRI and pelvic MRI reviewed and discussed with the patient.     Analgesics:   Patient is taking Acetaminophen. Patient informed that the maximum amount of acetaminophen taken on a regular basis should only be 4000 mg per day.     I have continued her Mobic 15mg daily. Patient is advised to take as prescribed and not take on an empty stomach.     Adjuvants:   Continue Norflex 100 mg twice daily as needed    Interventions:   None     Multidisciplinary Pain Management:   In the presence of complex, chronic, and multi-factorial pain, the importance of a multidisciplinary approach to pain management in the patient’s management regimen was emphasized and discussed in great detail.   PHYSICAL THERAPY: Patient is advised to see a physical therapist for gentle stretching exercises and conditioning exercises for

## 2025-07-09 DIAGNOSIS — L40.50 PSORIATIC ARTHRITIS (HCC): ICD-10-CM

## 2025-08-09 DIAGNOSIS — L40.50 PSORIATIC ARTHRITIS (HCC): ICD-10-CM

## 2025-08-11 RX ORDER — LEFLUNOMIDE 20 MG/1
20 TABLET ORAL DAILY
Qty: 60 TABLET | Refills: 0 | OUTPATIENT
Start: 2025-08-11

## 2025-08-12 ENCOUNTER — LAB (OUTPATIENT)
Dept: LAB | Age: 60
End: 2025-08-12

## 2025-08-12 ENCOUNTER — OFFICE VISIT (OUTPATIENT)
Age: 60
End: 2025-08-12
Payer: MEDICAID

## 2025-08-12 VITALS
SYSTOLIC BLOOD PRESSURE: 122 MMHG | BODY MASS INDEX: 32.52 KG/M2 | OXYGEN SATURATION: 98 % | HEART RATE: 60 BPM | WEIGHT: 190.5 LBS | HEIGHT: 64 IN | DIASTOLIC BLOOD PRESSURE: 74 MMHG

## 2025-08-12 DIAGNOSIS — Z79.899 ENCOUNTER FOR LONG-TERM (CURRENT) USE OF HIGH-RISK MEDICATION: Primary | ICD-10-CM

## 2025-08-12 DIAGNOSIS — L40.0 PLAQUE PSORIASIS: ICD-10-CM

## 2025-08-12 DIAGNOSIS — M51.360 DEGENERATION OF INTERVERTEBRAL DISC OF LUMBAR REGION WITH DISCOGENIC BACK PAIN: ICD-10-CM

## 2025-08-12 DIAGNOSIS — M15.9 OSTEOARTHRITIS OF MULTIPLE JOINTS, UNSPECIFIED OSTEOARTHRITIS TYPE: ICD-10-CM

## 2025-08-12 DIAGNOSIS — L40.50 PSORIATIC ARTHRITIS (HCC): ICD-10-CM

## 2025-08-12 DIAGNOSIS — Z79.899 ENCOUNTER FOR LONG-TERM (CURRENT) USE OF HIGH-RISK MEDICATION: ICD-10-CM

## 2025-08-12 DIAGNOSIS — L40.50 PSORIATIC ARTHRITIS (HCC): Primary | ICD-10-CM

## 2025-08-12 LAB
ALBUMIN SERPL BCG-MCNC: 4 G/DL (ref 3.4–4.9)
ALP SERPL-CCNC: 66 U/L (ref 38–126)
ALT SERPL W/O P-5'-P-CCNC: 24 U/L (ref 10–35)
ANION GAP SERPL CALC-SCNC: 10 MEQ/L (ref 8–16)
AST SERPL-CCNC: 21 U/L (ref 10–35)
BASOPHILS ABSOLUTE: 0.1 THOU/MM3 (ref 0–0.1)
BASOPHILS NFR BLD AUTO: 1.9 %
BILIRUB SERPL-MCNC: 0.4 MG/DL (ref 0.3–1.2)
BUN SERPL-MCNC: 12 MG/DL (ref 8–23)
CALCIUM SERPL-MCNC: 9.1 MG/DL (ref 8.5–10.5)
CHLORIDE SERPL-SCNC: 108 MEQ/L (ref 98–111)
CO2 SERPL-SCNC: 25 MEQ/L (ref 22–29)
CREAT SERPL-MCNC: 1.1 MG/DL (ref 0.5–0.9)
CRP SERPL-MCNC: < 0.3 MG/DL (ref 0–0.5)
DEPRECATED RDW RBC AUTO: 49.8 FL (ref 35–45)
EOSINOPHIL NFR BLD AUTO: 3.4 %
EOSINOPHILS ABSOLUTE: 0.2 THOU/MM3 (ref 0–0.4)
ERYTHROCYTE [DISTWIDTH] IN BLOOD BY AUTOMATED COUNT: 14.5 % (ref 11.5–14.5)
ERYTHROCYTE [SEDIMENTATION RATE] IN BLOOD BY WESTERGREN METHOD: 7 MM/HR (ref 0–20)
GFR SERPL CREATININE-BSD FRML MDRD: 57 ML/MIN/1.73M2
GLUCOSE SERPL-MCNC: 83 MG/DL (ref 74–109)
HCT VFR BLD AUTO: 40.6 % (ref 37–47)
HGB BLD-MCNC: 13 GM/DL (ref 12–16)
IMM GRANULOCYTES # BLD AUTO: 0.01 THOU/MM3 (ref 0–0.07)
IMM GRANULOCYTES NFR BLD AUTO: 0.1 %
LYMPHOCYTES ABSOLUTE: 2.7 THOU/MM3 (ref 1–4.8)
LYMPHOCYTES NFR BLD AUTO: 38.8 %
MCH RBC QN AUTO: 30.4 PG (ref 26–33)
MCHC RBC AUTO-ENTMCNC: 32 GM/DL (ref 32.2–35.5)
MCV RBC AUTO: 95.1 FL (ref 81–99)
MONOCYTES ABSOLUTE: 0.7 THOU/MM3 (ref 0.4–1.3)
MONOCYTES NFR BLD AUTO: 9.6 %
NEUTROPHILS ABSOLUTE: 3.2 THOU/MM3 (ref 1.8–7.7)
NEUTROPHILS NFR BLD AUTO: 46.2 %
NRBC BLD AUTO-RTO: 0 /100 WBC
PLATELET # BLD AUTO: 292 THOU/MM3 (ref 130–400)
PMV BLD AUTO: 11.5 FL (ref 9.4–12.4)
POTASSIUM SERPL-SCNC: 3.9 MEQ/L (ref 3.5–5.2)
PROT SERPL-MCNC: 6.7 G/DL (ref 6.4–8.3)
RBC # BLD AUTO: 4.27 MILL/MM3 (ref 4.2–5.4)
SODIUM SERPL-SCNC: 143 MEQ/L (ref 135–145)
WBC # BLD AUTO: 7 THOU/MM3 (ref 4.8–10.8)

## 2025-08-12 PROCEDURE — 99213 OFFICE O/P EST LOW 20 MIN: CPT | Performed by: NURSE PRACTITIONER

## 2025-08-12 PROCEDURE — 99214 OFFICE O/P EST MOD 30 MIN: CPT | Performed by: NURSE PRACTITIONER

## 2025-08-12 RX ORDER — LEFLUNOMIDE 20 MG/1
20 TABLET ORAL DAILY
Qty: 60 TABLET | Refills: 0 | Status: SHIPPED | OUTPATIENT
Start: 2025-08-12 | End: 2025-10-11

## 2025-08-12 RX ORDER — LEFLUNOMIDE 20 MG/1
20 TABLET ORAL DAILY
Qty: 60 TABLET | Refills: 0 | OUTPATIENT
Start: 2025-08-12

## 2025-08-12 ASSESSMENT — ENCOUNTER SYMPTOMS: BACK PAIN: 1

## 2025-08-12 ASSESSMENT — JOINT PAIN
TOTAL NUMBER OF TENDER JOINTS: 1
TOTAL NUMBER OF SWOLLEN JOINTS: 0

## (undated) DEVICE — MARKER,SKIN,WI/RULER AND LABELS: Brand: MEDLINE

## (undated) DEVICE — TOWEL,OR,DSP,ST,BLUE,STD,4/PK,20PK/CS: Brand: MEDLINE

## (undated) DEVICE — APPLICATOR MEDICATED 10.5 CC SOLUTION CLR STRL CHLORAPREP

## (undated) DEVICE — APPLICATOR MEDICATED 3 CC SOLUTION CLR STRL CHLORAPREP

## (undated) DEVICE — GAUZE SPONGES,USP TYPE VII GAUZE, 12 PLY: Brand: CURITY

## (undated) DEVICE — GLOVE BIOGEL POWDER FREE SZ 8

## (undated) DEVICE — SYRINGE MED 10ML LUERLOCK TIP W/O SFTY DISP

## (undated) DEVICE — NEEDLE SPNL 22GA L3.5IN BLK HUB S STL REG WALL FIT STYL W/

## (undated) DEVICE — NEEDLE HYPO 18GA L1.5IN THN WALL PIVOTING SHLD BVL ORIENTED

## (undated) DEVICE — 1840 FOAM BLOCK NEEDLE COUNTER: Brand: DEVON

## (undated) DEVICE — HYPODERMIC SAFETY NEEDLE: Brand: MAGELLAN

## (undated) DEVICE — 6 ML SYRINGE LUER-LOCK TIP: Brand: MONOJECT

## (undated) DEVICE — 3 ML SYRINGE LUER-LOCK TIP: Brand: MONOJECT